# Patient Record
Sex: FEMALE | Race: WHITE | URBAN - METROPOLITAN AREA
[De-identification: names, ages, dates, MRNs, and addresses within clinical notes are randomized per-mention and may not be internally consistent; named-entity substitution may affect disease eponyms.]

---

## 2017-04-12 RX ORDER — ERGOCALCIFEROL 1.25 MG/1
50000 CAPSULE ORAL WEEKLY
Qty: 12 CAPSULE | Refills: 1 | Status: SHIPPED | OUTPATIENT
Start: 2017-04-12 | End: 2017-10-19 | Stop reason: SDUPTHER

## 2017-09-27 RX ORDER — DICLOFENAC SODIUM 75 MG/1
TABLET, DELAYED RELEASE ORAL
Qty: 60 TABLET | Refills: 1 | Status: SHIPPED | OUTPATIENT
Start: 2017-09-27 | End: 2018-02-26 | Stop reason: SDUPTHER

## 2017-10-09 ENCOUNTER — OFFICE VISIT (OUTPATIENT)
Dept: FAMILY MEDICINE CLINIC | Age: 62
End: 2017-10-09

## 2017-10-09 VITALS
HEART RATE: 94 BPM | BODY MASS INDEX: 53.92 KG/M2 | WEIGHT: 293 LBS | DIASTOLIC BLOOD PRESSURE: 70 MMHG | SYSTOLIC BLOOD PRESSURE: 118 MMHG | HEIGHT: 62 IN | OXYGEN SATURATION: 92 %

## 2017-10-09 DIAGNOSIS — M25.552 LEFT HIP PAIN: ICD-10-CM

## 2017-10-09 DIAGNOSIS — E78.00 PURE HYPERCHOLESTEROLEMIA: ICD-10-CM

## 2017-10-09 DIAGNOSIS — Z00.00 ANNUAL PHYSICAL EXAM: Primary | ICD-10-CM

## 2017-10-09 DIAGNOSIS — M17.10 ARTHRITIS OF KNEE: ICD-10-CM

## 2017-10-09 DIAGNOSIS — E21.3 HYPERPARATHYROIDISM (HCC): ICD-10-CM

## 2017-10-09 DIAGNOSIS — I10 BENIGN HYPERTENSION: ICD-10-CM

## 2017-10-09 DIAGNOSIS — Z23 FLU VACCINE NEED: ICD-10-CM

## 2017-10-09 DIAGNOSIS — R73.03 PRE-DIABETES: ICD-10-CM

## 2017-10-09 LAB
HCT VFR BLD CALC: 38.6 % (ref 36–48)
HEMOGLOBIN: 12.4 G/DL (ref 12–16)
MCH RBC QN AUTO: 27.6 PG (ref 26–34)
MCHC RBC AUTO-ENTMCNC: 32.2 G/DL (ref 31–36)
MCV RBC AUTO: 85.7 FL (ref 80–100)
PDW BLD-RTO: 15.2 % (ref 12.4–15.4)
PLATELET # BLD: 176 K/UL (ref 135–450)
PMV BLD AUTO: 10.4 FL (ref 5–10.5)
RBC # BLD: 4.5 M/UL (ref 4–5.2)
WBC # BLD: 8.4 K/UL (ref 4–11)

## 2017-10-09 PROCEDURE — 99396 PREV VISIT EST AGE 40-64: CPT | Performed by: FAMILY MEDICINE

## 2017-10-09 PROCEDURE — 90686 IIV4 VACC NO PRSV 0.5 ML IM: CPT | Performed by: FAMILY MEDICINE

## 2017-10-09 PROCEDURE — 90471 IMMUNIZATION ADMIN: CPT | Performed by: FAMILY MEDICINE

## 2017-10-09 RX ORDER — PERPHENAZINE/AMITRIPTYLINE HCL 4MG-10MG
1 TABLET ORAL 2 TIMES DAILY
COMMUNITY
End: 2019-09-27

## 2017-10-09 ASSESSMENT — PATIENT HEALTH QUESTIONNAIRE - PHQ9
SUM OF ALL RESPONSES TO PHQ9 QUESTIONS 1 & 2: 0
1. LITTLE INTEREST OR PLEASURE IN DOING THINGS: 0
2. FEELING DOWN, DEPRESSED OR HOPELESS: 0
SUM OF ALL RESPONSES TO PHQ QUESTIONS 1-9: 0

## 2017-10-09 ASSESSMENT — ENCOUNTER SYMPTOMS: RESPIRATORY NEGATIVE: 1

## 2017-10-09 NOTE — PROGRESS NOTES
Vaccine Information Sheet, \"Influenza - Inactivated\"  given to [employer], or parent/legal guardian of  [employer] and verbalized understanding. Patient responses:    Have you ever had a reaction to a flu vaccine? No  Are you able to eat eggs without adverse effects? Yes  Do you have any current illness? No  Have you ever had Guillian Sharples Syndrome? No    Flu vaccine given per order. Please see immunization tab.

## 2017-10-09 NOTE — PROGRESS NOTES
Subjective:      Patient ID: Joanne Schreiber is a 60 y.o. female. HPI   Pt is a of 60 y.o. female comes in today with   Chief Complaint   Patient presents with    Annual Exam     Patient is here for her yearly physical, is fasting. Here for annual.  Notes increasing left hip pain. Known knee arthritis  Saw ortho. Wt loss recommended but trouble exercising due to decreased mobility due to knee and hip pain    blood sugar elevated in the past.    pth elevated previously  Did not follow up or get repeat bloodwork done as ordered    No Known Allergies  Current Outpatient Prescriptions on File Prior to Visit   Medication Sig Dispense Refill    diclofenac (VOLTAREN) 75 MG EC tablet TAKE 1 TABLET BY MOUTH 2 TIMES DAILY 60 tablet 1    simvastatin (ZOCOR) 20 MG tablet TAKE 1 TABLET BY MOUTH NIGHTLY 90 tablet 1    amLODIPine (NORVASC) 10 MG tablet TAKE 1 TABLET BY MOUTH EVERY DAY 90 tablet 1    metFORMIN (GLUCOPHAGE) 500 MG tablet TAKE 2 TABLETS BY MOUTH TWICE A DAY WITH MEALS 360 tablet 1    lisinopril (PRINIVIL;ZESTRIL) 40 MG tablet TAKE 1 TABLET BY MOUTH DAILY 90 tablet 1    atenolol (TENORMIN) 100 MG tablet TAKE 1 TABLET BY MOUTH DAILY 90 tablet 1    vitamin D (ERGOCALCIFEROL) 19960 UNITS CAPS capsule Take 1 capsule by mouth once a week 12 capsule 1    Omega-3 Fatty Acids (FISH OIL) 1000 MG CAPS Take 1,000 mg by mouth 2 times daily      vitamin B-12 (CYANOCOBALAMIN) 100 MCG tablet Take 100 mcg by mouth daily      aspirin 325 MG tablet Take 325 mg by mouth daily.  Multiple Vitamins-Minerals (THERAPEUTIC MULTIVITAMIN-MINERALS) tablet Take 1 tablet by mouth daily.  cyclobenzaprine (FLEXERIL) 10 MG tablet Take 1 tablet by mouth 3 times daily as needed for Muscle spasms 30 tablet 1    lisinopril-hydrochlorothiazide (PRINZIDE;ZESTORETIC) 20-25 MG per tablet TAKE 1 TABLET BY MOUTH DAILY. 30 tablet 2     No current facility-administered medications on file prior to visit.        Review of Systems Constitutional: Negative. Respiratory: Negative. Cardiovascular: Negative. Objective:   Physical Exam   Constitutional: She is oriented to person, place, and time. She appears well-developed and well-nourished. HENT:   Head: Normocephalic. Mouth/Throat: Oropharynx is clear and moist.   Eyes: Conjunctivae are normal. No scleral icterus. Neck: Normal range of motion. Neck supple. No thyromegaly present. Cardiovascular: Normal rate, normal heart sounds and intact distal pulses. Exam reveals no gallop. No murmur heard. Pulmonary/Chest: Effort normal and breath sounds normal. She has no wheezes. Abdominal: Soft. Normal appearance and normal aorta. She exhibits no distension. There is no splenomegaly or hepatomegaly. There is no tenderness. Musculoskeletal: She exhibits no edema. Lymphadenopathy:        Head (right side): No submandibular adenopathy present. Head (left side): No submandibular adenopathy present. She has no cervical adenopathy. Right: No supraclavicular adenopathy present. Left: No supraclavicular adenopathy present. Neurological: She is alert and oriented to person, place, and time. No cranial nerve deficit. Reflex Scores:       Bicep reflexes are 2+ on the right side and 2+ on the left side. Patellar reflexes are 2+ on the right side and 2+ on the left side. Skin: Skin is warm and dry. No cyanosis. Nails show no clubbing. Psychiatric: She has a normal mood and affect. Her behavior is normal.       Assessment:      1. Annual physical exam  Comprehensive Metabolic Panel    Lipid Panel    Hemoglobin A1C    Vitamin D 25 Hydroxy    PTH, INTACT    CBC    HIV Screen    HEPATITIS C ANTIBODY    CANCELED: Hepatitis C Antibody   2. Arthritis of knee     3. Left hip pain  XR HIP LEFT (2-3 VIEWS)   4. Pre-diabetes  Hemoglobin A1C   5. Benign hypertension  Comprehensive Metabolic Panel    Lipid Panel    CBC   6. Pure hypercholesterolemia     7. Hyperparathyroidism ([facility]  Vitamin D 25 Hydroxy    PTH, INTACT   8. Flu vaccine need  INFLUENZA, QUADV, 3 YRS AND OLDER, IM, PF, PREFILL SYR OR SDV, 0.5ML (FLUZONE QUADV, PF)          Plan:      Norris Homans was seen today for annual exam.    Diagnoses and all orders for this visit:    Annual physical exam  -     Comprehensive Metabolic Panel  -     Lipid Panel  -     Hemoglobin A1C  -     Vitamin D 25 Hydroxy  -     PTH, INTACT  -     CBC  -     Cancel: Hepatitis C Antibody  -     HIV Screen  -     HEPATITIS C ANTIBODY  Reviewed diet and exercise  Arthritis of knee  Wt loss recommended. Consider aquatic therapy  Referred to ortho  Left hip pain  -     XR HIP LEFT (2-3 VIEWS)  Referred to ortho. Pre-diabetes  -     Hemoglobin A1C  Has been stable w/o meds  Benign hypertension  -     Comprehensive Metabolic Panel  -     Lipid Panel  -     CBC  The current medical regimen is effective;  continue present plan and medications. Pure hypercholesterolemia  Stable on statin  Hyperparathyroidism ([facility]  -     Vitamin D 25 Hydroxy  -     PTH, INTACT  Overdue for repeat bloodwork.  If still high will need intervention  Flu vaccine need  -     INFLUENZA, QUADV, 3 YRS AND OLDER, IM, PF, PREFILL SYR OR SDV, 0.5ML (FLUZONE QUADV, PF)

## 2017-10-10 LAB
A/G RATIO: 1.5 (ref 1.1–2.2)
ALBUMIN SERPL-MCNC: 4.2 G/DL (ref 3.4–5)
ALP BLD-CCNC: 37 U/L (ref 40–129)
ALT SERPL-CCNC: 12 U/L (ref 10–40)
ANION GAP SERPL CALCULATED.3IONS-SCNC: 14 MMOL/L (ref 3–16)
AST SERPL-CCNC: 13 U/L (ref 15–37)
BILIRUB SERPL-MCNC: 0.3 MG/DL (ref 0–1)
BUN BLDV-MCNC: 21 MG/DL (ref 7–20)
CALCIUM SERPL-MCNC: 11.6 MG/DL (ref 8.3–10.6)
CHLORIDE BLD-SCNC: 104 MMOL/L (ref 99–110)
CHOLESTEROL, TOTAL: 164 MG/DL (ref 0–199)
CO2: 25 MMOL/L (ref 21–32)
CREAT SERPL-MCNC: 0.7 MG/DL (ref 0.6–1.2)
ESTIMATED AVERAGE GLUCOSE: 116.9 MG/DL
GFR AFRICAN AMERICAN: >60
GFR NON-AFRICAN AMERICAN: >60
GLOBULIN: 2.8 G/DL
GLUCOSE BLD-MCNC: 117 MG/DL (ref 70–99)
HBA1C MFR BLD: 5.7 %
HDLC SERPL-MCNC: 49 MG/DL (ref 40–60)
HEPATITIS C ANTIBODY INTERPRETATION: NORMAL
HIV-1 AND HIV-2 ANTIBODIES: NORMAL
LDL CHOLESTEROL CALCULATED: 93 MG/DL
PARATHYROID HORMONE INTACT: 127.7 PG/ML (ref 14–72)
POTASSIUM SERPL-SCNC: 5.1 MMOL/L (ref 3.5–5.1)
SODIUM BLD-SCNC: 143 MMOL/L (ref 136–145)
TOTAL PROTEIN: 7 G/DL (ref 6.4–8.2)
TRIGL SERPL-MCNC: 111 MG/DL (ref 0–150)
VITAMIN D 25-HYDROXY: 34.3 NG/ML
VLDLC SERPL CALC-MCNC: 22 MG/DL

## 2017-10-12 DIAGNOSIS — M81.8 OTHER OSTEOPOROSIS WITHOUT CURRENT PATHOLOGICAL FRACTURE: ICD-10-CM

## 2017-10-12 DIAGNOSIS — E21.3 HYPERPARATHYROIDISM (HCC): Primary | ICD-10-CM

## 2017-10-12 PROBLEM — M81.0 OSTEOPOROSIS WITHOUT CURRENT PATHOLOGICAL FRACTURE: Status: ACTIVE | Noted: 2017-10-12

## 2017-10-16 ENCOUNTER — OFFICE VISIT (OUTPATIENT)
Dept: ORTHOPEDIC SURGERY | Age: 62
End: 2017-10-16

## 2017-10-16 VITALS
HEART RATE: 75 BPM | DIASTOLIC BLOOD PRESSURE: 85 MMHG | HEIGHT: 62 IN | WEIGHT: 293 LBS | BODY MASS INDEX: 53.92 KG/M2 | SYSTOLIC BLOOD PRESSURE: 120 MMHG

## 2017-10-16 DIAGNOSIS — M17.0 PRIMARY OSTEOARTHRITIS OF BOTH KNEES: Primary | ICD-10-CM

## 2017-10-16 DIAGNOSIS — M16.10 PRIMARY OSTEOARTHRITIS OF HIP, UNSPECIFIED LATERALITY: ICD-10-CM

## 2017-10-16 PROCEDURE — 99243 OFF/OP CNSLTJ NEW/EST LOW 30: CPT | Performed by: ORTHOPAEDIC SURGERY

## 2017-10-16 NOTE — LETTER
[address] [facility], [address] [place], [place] [address]is, [address] [facility] [place] [address]               Phone: [phone] Fax: [phone]      10/16/2017     Re: Joanne Schreiber    Dear Dr. Drew Wade: Thank you for the kind referral of your patient Joanne Schreiber. I can't see her my office on October 16, 2017. As you know, she is a very nice 28-year-old woman who has BMI of 67 has been suffering from bilateral lower extremity pain. She does have mild osteoarthritis in both hips but also appears to have advanced osteoarthritis of bilateral knees on radiographs. I indicated to [facility] that I do think that her symptoms will improve significantly if we can help reduce some of the load that her legs are seen. For every 1 pound of weight loss, her knees will see approximately 3-4 pound loss of load which could imply a very significant amount of pain relief with a slight amount of weight loss. As such, I'm going to refer her for a targeted physical therapy program to see if we can get her on a low impact exercise program that may assist with this. She was very interested in this. Additionally, she was very interested in seeing if there are any other weight loss solutions which may be beneficial and I did refer her to the [facility] weight loss [facility]. Lastly, if she still has persistent pain, we can consider an intra-articular steroid injection to both knees at her next visit. As always, please feel free to contact me if any questions. Thank you for entrusting me with the care of your patients.     Sincerely,      Helen Juarez MD  Orthopaedic Surgeon, Sports Medicine  Director, Hip Arthroscopy and [facility]

## 2017-10-16 NOTE — PROGRESS NOTES
Outpatient Prescriptions   Medication Sig Dispense Refill    Cinnamon 500 MG TABS Take 1 tablet by mouth 2 times daily      Coconut Oil 1000 MG CAPS Take 1 capsule by mouth 2 times daily      diclofenac (VOLTAREN) 75 MG EC tablet TAKE 1 TABLET BY MOUTH 2 TIMES DAILY 60 tablet 1    simvastatin (ZOCOR) 20 MG tablet TAKE 1 TABLET BY MOUTH NIGHTLY 90 tablet 1    amLODIPine (NORVASC) 10 MG tablet TAKE 1 TABLET BY MOUTH EVERY DAY 90 tablet 1    metFORMIN (GLUCOPHAGE) 500 MG tablet TAKE 2 TABLETS BY MOUTH TWICE A DAY WITH MEALS 360 tablet 1    lisinopril (PRINIVIL;ZESTRIL) 40 MG tablet TAKE 1 TABLET BY MOUTH DAILY 90 tablet 1    atenolol (TENORMIN) 100 MG tablet TAKE 1 TABLET BY MOUTH DAILY 90 tablet 1    vitamin D (ERGOCALCIFEROL) 60151 UNITS CAPS capsule Take 1 capsule by mouth once a week 12 capsule 1    cyclobenzaprine (FLEXERIL) 10 MG tablet Take 1 tablet by mouth 3 times daily as needed for Muscle spasms 30 tablet 1    Omega-3 Fatty Acids (FISH OIL) 1000 MG CAPS Take 1,000 mg by mouth 2 times daily      vitamin B-12 (CYANOCOBALAMIN) 100 MCG tablet Take 100 mcg by mouth daily      lisinopril-hydrochlorothiazide (PRINZIDE;ZESTORETIC) 20-25 MG per tablet TAKE 1 TABLET BY MOUTH DAILY. 30 tablet 2    aspirin 325 MG tablet Take 325 mg by mouth daily.  Multiple Vitamins-Minerals (THERAPEUTIC MULTIVITAMIN-MINERALS) tablet Take 1 tablet by mouth daily. No current facility-administered medications for this visit. Allergies:  No Known Allergies    Physical Exam:  Vitals:    10/16/17 1046   BP: 120/85   Pulse: 75     General: Aggie Fatima is a 60y.o. year old female who is sitting comfortably in our office in no acute distress. Alert and oriented.       Objective:   Physical Exam  Vital Signs:  /85   Pulse 75   Ht 5' 2\" (1.575 m)   Wt (!) 370 lb (167.8 kg)   BMI 67.67 kg/m²     Constitution:  Generally, Aggie Fatima is [x] alert, [x] appears stated age, and [x] in no distress. Her general body habitus is [] Cachectic  [] Thin  [x] Normal  [] Obese  [] Morbidly Obese    Head: [x] Normocephalic  Eyes: [x] Extra-occular muscles intact  Left Ear: [x] External Ear normal   Right Ear: [x] External Ear normal   Nose: [x] Normal  Mouth: [x] Oral mucosa moist  [x] No perioral lesions    Pulmonary: [x] Respirations unlabored and regular  Skin: [x] Warm [x] Well perfused     Psychiatric:   [x] Good judgement and insight  [x] Oriented to [x] person, [x] place, and [x] time  [x] Mood appropriate for circumstances    Gait:   Gait is [] Normal  [x] Impaired Antalgic gait  Assistive Device: [x] None  [] Knee Brace  [] Cane  [] Crutches   [] [place]   [] Wheelchair  [] Other      Right and left Knee ORTHOPAEDIC  EXAM:   Inspection:  [x] Skin intact without abrasion, lacerations or rashes  [x] Leg lengths equal  [] Effusion:  [x] none  [] mild  [] moderate  [] severe     There is significant subcutaneous tissue anteriorly medially and laterally about both knees     Range of Motion:  [x] No flexion contracture         [] Deferred: acute injury/post-surgery/pain  [] Flexion contracture    Flexion: 5-90 pain on terminal motion    Palpation:   Diffuse tenderness particularly over medial joint line bilaterally    Ligamentous and Provocative testing:  [x] Negative - Stable in varus/valgus at 30 and 0 deg flexion, negative posterior drawer, negative Lachman      Motor Function:  [x] No gross motor weakness of hip [x] No gross motor weakness of knee  [x] No gross motor weakness of ankle    [x] No gross motor weakness of great toe    [x] Motor strength: 5/5 L4-S1    Neurologic:   [x] Sensation to light touch intact  [x] Coordination / proprioception intact  Motor function intact L2-S1    Circulation:  [x] The limb is warm and well perfused. [x] Capillary refill is intact.   [] Edema:  [x] none  [] mild  [] moderate  [] severe     Negative Homans sign - no calf tenderness    Bilateral hip exam:  No gross

## 2017-10-20 RX ORDER — LISINOPRIL 40 MG/1
40 TABLET ORAL DAILY
Qty: 90 TABLET | Refills: 1 | Status: SHIPPED | OUTPATIENT
Start: 2017-10-20 | End: 2018-03-28 | Stop reason: SDUPTHER

## 2017-10-20 RX ORDER — AMLODIPINE BESYLATE 10 MG/1
10 TABLET ORAL DAILY
Qty: 90 TABLET | Refills: 1 | Status: SHIPPED | OUTPATIENT
Start: 2017-10-20 | End: 2018-03-28 | Stop reason: SDUPTHER

## 2017-10-20 RX ORDER — ATENOLOL 100 MG/1
100 TABLET ORAL DAILY
Qty: 90 TABLET | Refills: 1 | Status: SHIPPED | OUTPATIENT
Start: 2017-10-20 | End: 2018-03-28 | Stop reason: SDUPTHER

## 2017-10-20 RX ORDER — ERGOCALCIFEROL 1.25 MG/1
50000 CAPSULE ORAL WEEKLY
Qty: 12 CAPSULE | Refills: 1 | Status: SHIPPED | OUTPATIENT
Start: 2017-10-20 | End: 2018-04-08 | Stop reason: SDUPTHER

## 2017-10-20 RX ORDER — SIMVASTATIN 20 MG
20 TABLET ORAL NIGHTLY
Qty: 90 TABLET | Refills: 1 | Status: SHIPPED | OUTPATIENT
Start: 2017-10-20 | End: 2018-03-28 | Stop reason: SDUPTHER

## 2017-10-24 RX ORDER — CYCLOBENZAPRINE HCL 10 MG
10 TABLET ORAL 3 TIMES DAILY PRN
Qty: 30 TABLET | Refills: 1 | Status: SHIPPED | OUTPATIENT
Start: 2017-10-24 | End: 2017-11-29

## 2017-10-24 NOTE — TELEPHONE ENCOUNTER
Last Fill - 4/15/16  Last Office Visit - 10/9/17 - Dr Payton Kidney  Pending Appointment - not scheduled

## 2017-11-29 ENCOUNTER — OFFICE VISIT (OUTPATIENT)
Dept: ENDOCRINOLOGY | Age: 62
End: 2017-11-29

## 2017-11-29 VITALS
BODY MASS INDEX: 53.92 KG/M2 | HEIGHT: 62 IN | WEIGHT: 293 LBS | SYSTOLIC BLOOD PRESSURE: 117 MMHG | OXYGEN SATURATION: 96 % | HEART RATE: 64 BPM | DIASTOLIC BLOOD PRESSURE: 66 MMHG

## 2017-11-29 DIAGNOSIS — E83.52 HYPERCALCEMIA: ICD-10-CM

## 2017-11-29 DIAGNOSIS — E21.3 HYPERPARATHYROIDISM (HCC): Primary | ICD-10-CM

## 2017-11-29 DIAGNOSIS — M81.0 POSTMENOPAUSAL OSTEOPOROSIS: ICD-10-CM

## 2017-11-29 DIAGNOSIS — E28.319 PREMATURE MENOPAUSE: ICD-10-CM

## 2017-11-29 DIAGNOSIS — E55.9 VITAMIN D DEFICIENCY: ICD-10-CM

## 2017-11-29 LAB
T4 FREE: 1.3 NG/DL (ref 0.9–1.8)
TSH SERPL DL<=0.05 MIU/L-ACNC: 2.01 UIU/ML (ref 0.27–4.2)

## 2017-11-29 PROCEDURE — 99244 OFF/OP CNSLTJ NEW/EST MOD 40: CPT | Performed by: INTERNAL MEDICINE

## 2017-11-29 RX ORDER — IBUPROFEN 200 MG
1 CAPSULE ORAL DAILY
Qty: 30 TABLET | Refills: 3 | Status: SHIPPED | OUTPATIENT
Start: 2017-11-29 | End: 2019-09-24

## 2017-11-29 RX ORDER — ALENDRONATE SODIUM 70 MG/1
70 TABLET ORAL
Qty: 4 TABLET | Refills: 3 | Status: SHIPPED | OUTPATIENT
Start: 2017-11-29 | End: 2019-09-27 | Stop reason: ALTCHOICE

## 2017-11-29 ASSESSMENT — PATIENT HEALTH QUESTIONNAIRE - PHQ9
SUM OF ALL RESPONSES TO PHQ9 QUESTIONS 1 & 2: 0
SUM OF ALL RESPONSES TO PHQ QUESTIONS 1-9: 0
1. LITTLE INTEREST OR PLEASURE IN DOING THINGS: 0
2. FEELING DOWN, DEPRESSED OR HOPELESS: 0
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
## 2017-12-04 DIAGNOSIS — E21.3 HYPERPARATHYROIDISM (HCC): ICD-10-CM

## 2017-12-04 LAB
CALCIUM 24 HOUR URINE: 195 MG/24 HR (ref 42–353)
MAGNESIUM: 1.2 MG/DL (ref 1.8–2.4)
PHOSPHORUS: 2.4 MG/DL (ref 2.5–4.9)
SODIUM 24 HOUR URINE: 125 MMOL/24 HR (ref 40–220)

## 2017-12-05 LAB
24HR URINE VOLUME (ML): 1400 ML
CREAT SERPL-MCNC: 0.6 MG/DL (ref 0.6–1.2)
CREATININE 24 HOUR URINE: 0.8 G/24HR (ref 0.6–1.5)
CREATININE CLEARANCE: 97 ML/MIN (ref 88–128)
Lab: 24 HR

## 2017-12-06 ENCOUNTER — TELEPHONE (OUTPATIENT)
Dept: FAMILY MEDICINE CLINIC | Age: 62
End: 2017-12-06

## 2017-12-06 DIAGNOSIS — E21.3 HYPERPARATHYROIDISM (HCC): Primary | ICD-10-CM

## 2017-12-06 DIAGNOSIS — E83.42 HYPOMAGNESEMIA: ICD-10-CM

## 2017-12-06 LAB
CALCIUM IONIZED, CALC AT PH 7.4: 1.59 MMOL/L (ref 1.11–1.3)
CALCIUM IONIZED: 1.5 MMOL/L (ref 1.11–1.3)

## 2017-12-07 ENCOUNTER — TELEPHONE (OUTPATIENT)
Dept: ENDOCRINOLOGY | Age: 62
End: 2017-12-07

## 2017-12-07 NOTE — TELEPHONE ENCOUNTER
Called Mrs Moise Fenton to find out what type of dye she is allergic to as her chart states no drug allergies. She stated she forgot to mention this at recent office visit. She also stated she has very thin veins and is a very difficult stick. Would like to know if there is a test  she can do that will use oral dye?

## 2017-12-07 NOTE — TELEPHONE ENCOUNTER
Called Mrs. Macdonald and left a message to contact the office for message below:      Yosef Juarez MD Physician Signed  Date of Service: 12/07/2017 10:53 AM         Spoke to [facility] at [facility]   It has sestamibi contrast and not iodine. Spoke to patient. She reports mild rash with iodinated contrast done in her 30's for kidney issues. Never had Sestimibi contrast. She already scheduled the scan for next Thursday.

## 2017-12-14 ENCOUNTER — TELEPHONE (OUTPATIENT)
Dept: ENDOCRINOLOGY | Age: 62
End: 2017-12-14

## 2017-12-14 ENCOUNTER — HOSPITAL ENCOUNTER (OUTPATIENT)
Dept: ULTRASOUND IMAGING | Age: 62
Discharge: HOME | End: 2017-12-14
Attending: INTERNAL MEDICINE | Admitting: INTERNAL MEDICINE

## 2017-12-14 DIAGNOSIS — E21.3 HYPERPARATHYROIDISM (HCC): ICD-10-CM

## 2017-12-15 NOTE — TELEPHONE ENCOUNTER
Left another  message for Mrs Bishop  to contact the office for results below:        Message from Aristides Mirza MD sent at 12/14/2017 10:47 AM EST -----  Please inform thyroid US shows multiple small nodules but no parathyroid adenoma. Will wait for parathyroid scan.    Thank you

## 2017-12-18 ENCOUNTER — TELEPHONE (OUTPATIENT)
Dept: ENDOCRINOLOGY | Age: 62
End: 2017-12-18

## 2017-12-18 DIAGNOSIS — E21.3 HYPERPARATHYROIDISM (HCC): Primary | ICD-10-CM

## 2017-12-18 NOTE — TELEPHONE ENCOUNTER
Pt called to have Dr. Zane Cadet call her back about her recent tests at [facility] [employer] [phone], she will be there until 5pm today

## 2017-12-28 ENCOUNTER — TELEPHONE (OUTPATIENT)
Dept: ENDOCRINOLOGY | Age: 62
End: 2017-12-28

## 2018-02-02 ENCOUNTER — TELEPHONE (OUTPATIENT)
Dept: ENDOCRINOLOGY | Age: 63
End: 2018-02-02

## 2018-02-02 NOTE — TELEPHONE ENCOUNTER
Pt was referred to Dr. Cass Lacey by Dr. Leonora Vale, Dr. Cass Lacey would like the scans of the pt's Parathyroid faxed to their office at [phone], she has a consultation appt on 2/14/18 at that office

## 2018-02-02 NOTE — TELEPHONE ENCOUNTER
Mrs. Senior Giovanny stated the report needs to be faxed. Scans are not needed. Report faxed to number provided.

## 2018-02-26 RX ORDER — DICLOFENAC SODIUM 75 MG/1
TABLET, DELAYED RELEASE ORAL
Qty: 60 TABLET | Refills: 1 | Status: SHIPPED | OUTPATIENT
Start: 2018-02-26 | End: 2018-07-05 | Stop reason: SDUPTHER

## 2018-03-20 ENCOUNTER — TELEPHONE (OUTPATIENT)
Dept: FAMILY MEDICINE CLINIC | Age: 63
End: 2018-03-20

## 2018-03-28 RX ORDER — SIMVASTATIN 20 MG
20 TABLET ORAL NIGHTLY
Qty: 90 TABLET | Refills: 1 | Status: SHIPPED | OUTPATIENT
Start: 2018-03-28 | End: 2018-10-24 | Stop reason: SDUPTHER

## 2018-03-28 RX ORDER — LISINOPRIL 40 MG/1
40 TABLET ORAL DAILY
Qty: 90 TABLET | Refills: 1 | Status: SHIPPED | OUTPATIENT
Start: 2018-03-28 | End: 2018-09-06 | Stop reason: SDUPTHER

## 2018-03-28 RX ORDER — AMLODIPINE BESYLATE 10 MG/1
10 TABLET ORAL DAILY
Qty: 90 TABLET | Refills: 1 | Status: SHIPPED | OUTPATIENT
Start: 2018-03-28 | End: 2018-09-06 | Stop reason: SDUPTHER

## 2018-03-28 RX ORDER — ATENOLOL 100 MG/1
100 TABLET ORAL DAILY
Qty: 90 TABLET | Refills: 1 | Status: SHIPPED | OUTPATIENT
Start: 2018-03-28 | End: 2018-10-24 | Stop reason: SDUPTHER

## 2018-04-09 ENCOUNTER — OFFICE VISIT (OUTPATIENT)
Dept: FAMILY MEDICINE CLINIC | Age: 63
End: 2018-04-09

## 2018-04-09 VITALS
OXYGEN SATURATION: 98 % | DIASTOLIC BLOOD PRESSURE: 68 MMHG | WEIGHT: 293 LBS | HEART RATE: 56 BPM | HEIGHT: 62 IN | BODY MASS INDEX: 53.92 KG/M2 | SYSTOLIC BLOOD PRESSURE: 100 MMHG

## 2018-04-09 DIAGNOSIS — Z01.818 PREOP EXAMINATION: Primary | ICD-10-CM

## 2018-04-09 DIAGNOSIS — R73.03 PRE-DIABETES: ICD-10-CM

## 2018-04-09 DIAGNOSIS — E78.00 PURE HYPERCHOLESTEROLEMIA: ICD-10-CM

## 2018-04-09 DIAGNOSIS — E21.3 HYPERPARATHYROIDISM (HCC): ICD-10-CM

## 2018-04-09 DIAGNOSIS — I10 BENIGN HYPERTENSION: ICD-10-CM

## 2018-04-09 LAB
A/G RATIO: 1.7 (ref 1.1–2.2)
ALBUMIN SERPL-MCNC: 4.4 G/DL (ref 3.4–5)
ALP BLD-CCNC: 40 U/L (ref 40–129)
ALT SERPL-CCNC: 9 U/L (ref 10–40)
ANION GAP SERPL CALCULATED.3IONS-SCNC: 18 MMOL/L (ref 3–16)
AST SERPL-CCNC: 13 U/L (ref 15–37)
BILIRUB SERPL-MCNC: 0.4 MG/DL (ref 0–1)
BUN BLDV-MCNC: 14 MG/DL (ref 7–20)
CALCIUM SERPL-MCNC: 10.7 MG/DL (ref 8.3–10.6)
CHLORIDE BLD-SCNC: 101 MMOL/L (ref 99–110)
CHOLESTEROL, TOTAL: 169 MG/DL (ref 0–199)
CO2: 22 MMOL/L (ref 21–32)
CREAT SERPL-MCNC: 0.6 MG/DL (ref 0.6–1.2)
GFR AFRICAN AMERICAN: >60
GFR NON-AFRICAN AMERICAN: >60
GLOBULIN: 2.6 G/DL
GLUCOSE BLD-MCNC: 102 MG/DL (ref 70–99)
HCT VFR BLD CALC: 37.1 % (ref 36–48)
HDLC SERPL-MCNC: 52 MG/DL (ref 40–60)
HEMOGLOBIN: 12.1 G/DL (ref 12–16)
LDL CHOLESTEROL CALCULATED: 90 MG/DL
MCH RBC QN AUTO: 27.4 PG (ref 26–34)
MCHC RBC AUTO-ENTMCNC: 32.5 G/DL (ref 31–36)
MCV RBC AUTO: 84.3 FL (ref 80–100)
PARATHYROID HORMONE INTACT: 171.3 PG/ML (ref 14–72)
PDW BLD-RTO: 14.3 % (ref 12.4–15.4)
PLATELET # BLD: 204 K/UL (ref 135–450)
PMV BLD AUTO: 10.8 FL (ref 5–10.5)
POTASSIUM SERPL-SCNC: 5.6 MMOL/L (ref 3.5–5.1)
RBC # BLD: 4.4 M/UL (ref 4–5.2)
SODIUM BLD-SCNC: 141 MMOL/L (ref 136–145)
TOTAL PROTEIN: 7 G/DL (ref 6.4–8.2)
TRIGL SERPL-MCNC: 136 MG/DL (ref 0–150)
VLDLC SERPL CALC-MCNC: 27 MG/DL
WBC # BLD: 7.1 K/UL (ref 4–11)

## 2018-04-09 PROCEDURE — 99214 OFFICE O/P EST MOD 30 MIN: CPT | Performed by: FAMILY MEDICINE

## 2018-04-09 RX ORDER — ERGOCALCIFEROL 1.25 MG/1
50000 CAPSULE ORAL WEEKLY
Qty: 12 CAPSULE | Refills: 1 | Status: SHIPPED | OUTPATIENT
Start: 2018-04-09 | End: 2018-08-08 | Stop reason: SDUPTHER

## 2018-04-09 ASSESSMENT — ENCOUNTER SYMPTOMS: RESPIRATORY NEGATIVE: 1

## 2018-04-10 LAB
ESTIMATED AVERAGE GLUCOSE: 122.6 MG/DL
HBA1C MFR BLD: 5.9 %
TSH REFLEX: 4.01 UIU/ML (ref 0.27–4.2)
VITAMIN D 25-HYDROXY: 36.4 NG/ML

## 2018-04-17 ENCOUNTER — TELEPHONE (OUTPATIENT)
Dept: FAMILY MEDICINE CLINIC | Age: 63
End: 2018-04-17

## 2018-06-07 ENCOUNTER — TELEPHONE (OUTPATIENT)
Dept: FAMILY MEDICINE CLINIC | Age: 63
End: 2018-06-07

## 2018-07-06 RX ORDER — DICLOFENAC SODIUM 75 MG/1
TABLET, DELAYED RELEASE ORAL
Qty: 60 TABLET | Refills: 1 | Status: SHIPPED | OUTPATIENT
Start: 2018-07-06 | End: 2018-10-19 | Stop reason: SDUPTHER

## 2018-08-09 RX ORDER — ERGOCALCIFEROL 1.25 MG/1
50000 CAPSULE ORAL WEEKLY
Qty: 4 CAPSULE | Refills: 0 | Status: SHIPPED | OUTPATIENT
Start: 2018-08-09 | End: 2018-09-07 | Stop reason: SDUPTHER

## 2018-08-23 ENCOUNTER — TELEPHONE (OUTPATIENT)
Dept: FAMILY MEDICINE CLINIC | Age: 63
End: 2018-08-23

## 2018-09-07 RX ORDER — ERGOCALCIFEROL 1.25 MG/1
CAPSULE ORAL
Qty: 4 CAPSULE | Refills: 0 | Status: SHIPPED | OUTPATIENT
Start: 2018-09-07 | End: 2018-10-05 | Stop reason: SDUPTHER

## 2018-09-07 RX ORDER — AMLODIPINE BESYLATE 10 MG/1
TABLET ORAL
Qty: 90 TABLET | Refills: 0 | Status: SHIPPED | OUTPATIENT
Start: 2018-09-07 | End: 2018-12-07 | Stop reason: SDUPTHER

## 2018-09-07 RX ORDER — LISINOPRIL 40 MG/1
40 TABLET ORAL DAILY
Qty: 90 TABLET | Refills: 0 | Status: SHIPPED | OUTPATIENT
Start: 2018-09-07 | End: 2018-12-07 | Stop reason: SDUPTHER

## 2018-09-07 NOTE — TELEPHONE ENCOUNTER
Medication:   Requested Prescriptions     Pending Prescriptions Disp Refills    vitamin D (ERGOCALCIFEROL) 49378 units CAPS capsule [Pharmacy Med Name: VITAMIN D2 50,000IU (ERGO) CAP RX] 4 capsule 0     Sig: TAKE 1 CAPSULE BY MOUTH 1 TIME A WEEK       Last Filled:      Patient Phone Number: [phone] (home) [phone] (work)    Last appt: 4/9/2018   Next appt: Visit date not found    Last Labs DM:   Lab Results   Component Value Date    VITD25 36.4 04/09/2018    VITD25 34.3 10/09/2017       Last OARRS: No flowsheet data found.     Preferred Pharmacy:   [facility] [address] [place], [place] [address] - [facility] [phone] Alex Aneudy [phone]  [address] [place] [address] [id number] [place] [id number]  Phone: [phone] Fax: [phone]

## 2018-10-05 RX ORDER — ERGOCALCIFEROL 1.25 MG/1
CAPSULE ORAL
Qty: 4 CAPSULE | Refills: 0 | Status: SHIPPED | OUTPATIENT
Start: 2018-10-05 | End: 2019-09-27 | Stop reason: ALTCHOICE

## 2018-10-22 RX ORDER — DICLOFENAC SODIUM 75 MG/1
75 TABLET, DELAYED RELEASE ORAL 2 TIMES DAILY
Qty: 60 TABLET | Refills: 0 | Status: SHIPPED | OUTPATIENT
Start: 2018-10-22 | End: 2018-11-23 | Stop reason: SDUPTHER

## 2018-10-24 RX ORDER — SIMVASTATIN 20 MG
TABLET ORAL
Qty: 90 TABLET | Refills: 0 | Status: SHIPPED | OUTPATIENT
Start: 2018-10-24 | End: 2019-01-22 | Stop reason: SDUPTHER

## 2018-10-24 RX ORDER — ATENOLOL 100 MG/1
TABLET ORAL
Qty: 90 TABLET | Refills: 0 | Status: SHIPPED | OUTPATIENT
Start: 2018-10-24 | End: 2019-01-22 | Stop reason: SDUPTHER

## 2018-11-23 RX ORDER — DICLOFENAC SODIUM 75 MG/1
TABLET, DELAYED RELEASE ORAL
Qty: 60 TABLET | Refills: 2 | Status: SHIPPED | OUTPATIENT
Start: 2018-11-23 | End: 2019-04-24 | Stop reason: SDUPTHER

## 2018-12-07 RX ORDER — LISINOPRIL 40 MG/1
40 TABLET ORAL DAILY
Qty: 90 TABLET | Refills: 0 | Status: SHIPPED | OUTPATIENT
Start: 2018-12-07 | End: 2019-03-11 | Stop reason: SDUPTHER

## 2018-12-07 RX ORDER — AMLODIPINE BESYLATE 10 MG/1
TABLET ORAL
Qty: 90 TABLET | Refills: 0 | Status: SHIPPED | OUTPATIENT
Start: 2018-12-07 | End: 2019-03-11 | Stop reason: SDUPTHER

## 2019-01-22 RX ORDER — ATENOLOL 100 MG/1
100 TABLET ORAL DAILY
Qty: 90 TABLET | Refills: 1 | Status: SHIPPED | OUTPATIENT
Start: 2019-01-22 | End: 2019-05-16

## 2019-01-22 RX ORDER — SIMVASTATIN 20 MG
20 TABLET ORAL NIGHTLY
Qty: 90 TABLET | Refills: 1 | Status: SHIPPED | OUTPATIENT
Start: 2019-01-22 | End: 2019-09-08 | Stop reason: SDUPTHER

## 2019-03-11 RX ORDER — LISINOPRIL 40 MG/1
40 TABLET ORAL DAILY
Qty: 90 TABLET | Refills: 0 | Status: SHIPPED | OUTPATIENT
Start: 2019-03-11 | End: 2019-06-23 | Stop reason: SDUPTHER

## 2019-03-11 RX ORDER — AMLODIPINE BESYLATE 10 MG/1
TABLET ORAL
Qty: 90 TABLET | Refills: 0 | Status: SHIPPED | OUTPATIENT
Start: 2019-03-11 | End: 2019-06-23 | Stop reason: SDUPTHER

## 2019-04-24 RX ORDER — DICLOFENAC SODIUM 75 MG/1
TABLET, DELAYED RELEASE ORAL
Qty: 60 TABLET | Refills: 0 | Status: SHIPPED | OUTPATIENT
Start: 2019-04-24 | End: 2019-09-24 | Stop reason: ALTCHOICE

## 2019-05-08 ENCOUNTER — TELEPHONE (OUTPATIENT)
Dept: FAMILY MEDICINE CLINIC | Age: 64
End: 2019-05-08

## 2019-05-08 NOTE — TELEPHONE ENCOUNTER
Received fmla forms to be completed. Left message for patient that she needs to have an appointment for these to be completed.

## 2019-05-13 ENCOUNTER — OFFICE VISIT (OUTPATIENT)
Dept: FAMILY MEDICINE CLINIC | Age: 64
End: 2019-05-13
Payer: COMMERCIAL

## 2019-05-13 VITALS
DIASTOLIC BLOOD PRESSURE: 88 MMHG | RESPIRATION RATE: 20 BRPM | SYSTOLIC BLOOD PRESSURE: 132 MMHG | OXYGEN SATURATION: 90 % | HEIGHT: 62 IN | HEART RATE: 101 BPM | BODY MASS INDEX: 66.94 KG/M2

## 2019-05-13 DIAGNOSIS — E78.00 PURE HYPERCHOLESTEROLEMIA: ICD-10-CM

## 2019-05-13 DIAGNOSIS — R73.03 PRE-DIABETES: ICD-10-CM

## 2019-05-13 DIAGNOSIS — I10 BENIGN HYPERTENSION: ICD-10-CM
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
## 2019-05-13 DIAGNOSIS — M17.10 ARTHRITIS OF KNEE: Primary | ICD-10-CM

## 2019-05-13 DIAGNOSIS — R26.81 UNSTEADY GAIT: ICD-10-CM

## 2019-05-13 LAB
A/G RATIO: 1.1 (ref 1.1–2.2)
ALBUMIN SERPL-MCNC: 3.7 G/DL (ref 3.4–5)
ALP BLD-CCNC: 41 U/L (ref 40–129)
ALT SERPL-CCNC: 10 U/L (ref 10–40)
ANION GAP SERPL CALCULATED.3IONS-SCNC: 13 MMOL/L (ref 3–16)
AST SERPL-CCNC: 21 U/L (ref 15–37)
BILIRUB SERPL-MCNC: 0.4 MG/DL (ref 0–1)
BUN BLDV-MCNC: 10 MG/DL (ref 7–20)
CALCIUM SERPL-MCNC: 9.2 MG/DL (ref 8.3–10.6)
CHLORIDE BLD-SCNC: 101 MMOL/L (ref 99–110)
CHOLESTEROL, TOTAL: 149 MG/DL (ref 0–199)
CO2: 29 MMOL/L (ref 21–32)
CREAT SERPL-MCNC: 0.8 MG/DL (ref 0.6–1.2)
GFR AFRICAN AMERICAN: >60
GFR NON-AFRICAN AMERICAN: >60
GLOBULIN: 3.4 G/DL
GLUCOSE BLD-MCNC: 119 MG/DL (ref 70–99)
HDLC SERPL-MCNC: 39 MG/DL (ref 40–60)
LDL CHOLESTEROL CALCULATED: 88 MG/DL
POTASSIUM SERPL-SCNC: 4.9 MMOL/L (ref 3.5–5.1)
SODIUM BLD-SCNC: 143 MMOL/L (ref 136–145)
TOTAL PROTEIN: 7.1 G/DL (ref 6.4–8.2)
TRIGL SERPL-MCNC: 112 MG/DL (ref 0–150)
VLDLC SERPL CALC-MCNC: 22 MG/DL

## 2019-05-13 PROCEDURE — 99214 OFFICE O/P EST MOD 30 MIN: CPT | Performed by: FAMILY MEDICINE

## 2019-05-13 RX ORDER — NYSTATIN 100000 U/G
CREAM TOPICAL
Qty: 30 G | Refills: 2 | Status: SHIPPED | OUTPATIENT
Start: 2019-05-13 | End: 2019-09-27 | Stop reason: SDUPTHER

## 2019-05-13 RX ORDER — OLOPATADINE HYDROCHLORIDE 1 MG/ML
1 SOLUTION/ DROPS OPHTHALMIC 2 TIMES DAILY
Qty: 1 BOTTLE | Refills: 2 | Status: SHIPPED | OUTPATIENT
Start: 2019-05-13 | End: 2019-06-12

## 2019-05-13 ASSESSMENT — ENCOUNTER SYMPTOMS: RESPIRATORY NEGATIVE: 1

## 2019-05-13 ASSESSMENT — PATIENT HEALTH QUESTIONNAIRE - PHQ9
2. FEELING DOWN, DEPRESSED OR HOPELESS: 0
SUM OF ALL RESPONSES TO PHQ QUESTIONS 1-9: 0
SUM OF ALL RESPONSES TO PHQ9 QUESTIONS 1 & 2: 0
SUM OF ALL RESPONSES TO PHQ QUESTIONS 1-9: 0
1. LITTLE INTEREST OR PLEASURE IN DOING THINGS: 0

## 2019-05-13 NOTE — LETTER
Patient Name: Rachel Mendosa  [birth year]      [facility]  Certification of Health Care Provider for  [facility] Serious Health Condition  (Family and Medical Leave Act)    Attached please find the applicable completed [address] [place] [address] [facility]) certification form. [address] [facility] certification forms (W-380-E, W-380-F, [id number], & WH-385-V). Accordingly, the attached form complies in all material respects with applicable [facility] regulations and is being provided in lieu of any certification forms submitted to [facility]) by the Employer. Provider's name: Alec Martines MD      [facility] PRIMARY CARE  [facility] [address] [address]  Dept: [phone]  Dept Fax: [phone]  Loc: [phone]    PART A: MEDICAL FACTS  1. Approximate date condition commenced: 1/1/19. Probable duration of condition: lifetime. Scout below as applicable:  Was the patient admitted for an overnight stay in a hospital, hospice, or residential medical     care facility? No  If so, dates of admission: N/A. Date(s) you treated the patient for condition: 5/13/19. Will the patient need to have treatment visits at least twice per year due to the condition? Yes. Was medication, other than over-the-counter medication, prescribed? No     Was the patient referred to other health care provider(s) for evaluation or treatment (e.g.,            physical therapist)? Yes. If so, state the nature of such treatments and expected          duration of treatment: physical therapy. 2. Is the medical condition pregnancy? No. If so, expected delivery date: N/A.    3. Use any information provided by the employer to answer this question. If the employer fails to provide a list of the employee's essential functions or a job description, answer these             questions based upon the employee's own description of his/her job functions. Is the employee unable to perform any of his/her job functions due to the condition:             Yes.    If so, Identify the job functions the employee is unable to perform: unable to stand or walk on days knees are bad. 4. Describe other relevant medical facts, if any, related to the condition for which the employee    seeks leave (such medical facts may include symptoms, diagnosis, or any regimen of continuing treatment such as the use of specialized equipment): condition is progressive and results in severe pain. flares are difficult to predict. PART B: AMOUNT OF LEAVE NEEDED  5. Will the employee be incapacitated for a single continuous period of time due to his/her medical condition, including any time for treatment and recovery? No.     If so, estimate the beginning and ending dates for the period of incapacity: 1/1/19 through 1/1/20.    6. Will the employee need to attend follow-up treatment appointments or work part-time or on a reduced schedule because of the employee's medical condition? Yes. If so, are the treatments or the reduced number of hours of work medically necessary? Yes. Estimated treatment schedule, if any, including the dates of any scheduled   appointments and the time required for each appointment, including any recovery period: PT will be set up. will need to go 2-3 times/week for this. Estimate the part-time or reduced work schedule the employee needs, if any: 1 hour(s) per day, 2 day(s) per week from 5/13/19 through 8/13/19.       7. Will the condition cause episodic flare-ups periodically preventing the employee from  performing his/her job functions? Yes. Is it medically necessary for the employee to be absent from work during flare-ups? Yes. If so, explain, unable to work during flares due to severe pain.               Based upon the patient's medical history and your knowledge of the medical condition, estimate the frequency of flare-ups and the duration of related incapacity that the patient may have over the next 6 months (e.g., 1 episode every 3 months lasting 1-2 days): 3 episodes/month, 2-4 days per episode      ___ ____  Sara Garcia  5/13/19

## 2019-05-13 NOTE — PROGRESS NOTES
getting out after recent fall  Pre-diabetes  -     Hemoglobin A1C; Future  Noncompliant. Rechecking bloodwork. Benign hypertension  -     Lipid Panel; Future  -     Comprehensive Metabolic Panel; Future  Likely resume ace and amlodipine  Pure hypercholesterolemia  Noncompliant. Will restart statin if needed  Fall, subsequent encounter, Unsteady gait  Referred to physical therapy   Recommend home evaluation for services  Other orders  -     olopatadine (PATANOL) 0.1 % ophthalmic solution; Place 1 drop into both eyes 2 times daily  -     nystatin (MYCOSTATIN) 582535 UNIT/GM cream; Apply topically 2 times daily.              Rancho Lock MD

## 2019-05-14 ENCOUNTER — TELEPHONE (OUTPATIENT)
Dept: FAMILY MEDICINE CLINIC | Age: 64
End: 2019-05-14

## 2019-05-14 LAB
ESTIMATED AVERAGE GLUCOSE: 128.4 MG/DL
HBA1C MFR BLD: 6.1 %

## 2019-05-16 NOTE — TELEPHONE ENCOUNTER
Called patient regarding her blood results, results given to her. Asking if she should start taking all her medications again or just some of them. She has not taking any medication in over a month. Needs to know when she should start work again .     Would like to go back  Tues 5/21/19    Call back [phone] (MY)

## 2019-05-17 NOTE — TELEPHONE ENCOUNTER
Left message for patient to call back. Also see result note. Notes recorded by Nirav Keller MD on 5/16/2019 at 7:12 PM EDT  Recommend simvastatin since indicated with DM  Can hold metformin and atenolol. Recommend amlodipine and lisinopril.   I'm not sure how her knee is doing; if she feels safe to return Monday or if needs another week

## 2019-05-17 NOTE — TELEPHONE ENCOUNTER
Patient informed - would like to go back to work on Tuesday.  Will call us back if she needs a note to go back

## 2019-05-23 ENCOUNTER — TELEPHONE (OUTPATIENT)
Dept: FAMILY MEDICINE CLINIC | Age: 64
End: 2019-05-23

## 2019-05-23 NOTE — LETTER
[facility] Primary Care  [address] [address]  Phone: [phone]  Fax: [phone]    Starla Haji MD        May 23, 2019      Maribel Jackson   1955  Employee ID#: [id number]    To Whom it May Concern    It is in my medial opinion that Maribel Jackson ( 53) to be off work 19-19. She may return to work 2019 with no restrictions. If you have any other questions or concerns, please don't hesitate to call.       Sincerely,    Starla Haji MD

## 2019-05-23 NOTE — TELEPHONE ENCOUNTER
Pt called requesting that a letter be written as soon as possible for her to return to work. Pt states her employer\"s making her leave work today until the letter has been received. Letter must include:    Dates off - 04/22-05/22  Employee ID# - [id number]  State that pt is able to return to work    Fax letter to both  - hr - [phone] and manager - [phone]    Please call pt once letter has been completed and faxed at [phone].

## 2019-05-31 ENCOUNTER — TELEPHONE (OUTPATIENT)
Dept: FAMILY MEDICINE CLINIC | Age: 64
End: 2019-05-31

## 2019-05-31 NOTE — TELEPHONE ENCOUNTER
[phone] - left  for patient. Informed in message this was faxed on 05.13.2019 and need to know what if anything else needs to be done.

## 2019-06-03 NOTE — TELEPHONE ENCOUNTER
Patient called at 5:26 pm on Friday, May 31. She left a message on our after hours voicemail. Regarding the FMLA paperwork sent in by our office. They sent back the paperwork for this and for her leave for the fall in April. The two got intertwined together and she was denied the FMLA. She needs to have the paperwork from the beginning of this past January to next January filled out and faxed back as this has been denied and she could loose her job. She asked this be done ASAP please.

## 2019-06-03 NOTE — TELEPHONE ENCOUNTER
Spoke with patient. She said that her employer is denying her fmla. States that they are getting it mixed up with the fall she had in April. Advised patient to have them give us a call so we can figure out what they need us to do so she can have it approved.

## 2019-06-12 ENCOUNTER — E-VISIT (OUTPATIENT)
Dept: FAMILY MEDICINE CLINIC | Age: 64
End: 2019-06-12
Payer: COMMERCIAL

## 2019-06-12 PROCEDURE — 98969 PR NONPHYSICIAN ONLINE ASSESSMENT AND MANAGEMENT: CPT | Performed by: NURSE PRACTITIONER

## 2019-06-12 ASSESSMENT — LIFESTYLE VARIABLES
SMOKING_STATUS: NO, I'M A FORMER SMOKER
PACKS_PER_DAY: 1
SMOKING_YEARS: 15

## 2019-06-13 RX ORDER — AZITHROMYCIN 250 MG/1
TABLET, FILM COATED ORAL
Qty: 6 TABLET | Refills: 0 | Status: SHIPPED | OUTPATIENT
Start: 2019-06-13 | End: 2019-09-24 | Stop reason: ALTCHOICE

## 2019-06-14 ENCOUNTER — TELEPHONE (OUTPATIENT)
Dept: FAMILY MEDICINE CLINIC | Age: 64
End: 2019-06-14

## 2019-06-14 NOTE — TELEPHONE ENCOUNTER
Patient missed Tuesday, today and a couple of days last week and would like someone to call her about updating her [place] paperwork.

## 2019-06-14 NOTE — TELEPHONE ENCOUNTER
Verbal from Dr GALLOWAY - rick will not update FMLA anymore than already says. Patient informed. Acknowledged understanding.

## 2019-06-24 RX ORDER — AMLODIPINE BESYLATE 10 MG/1
TABLET ORAL
Qty: 90 TABLET | Refills: 0 | Status: SHIPPED | OUTPATIENT
Start: 2019-06-24 | End: 2019-09-24 | Stop reason: ALTCHOICE

## 2019-06-24 RX ORDER — LISINOPRIL 40 MG/1
TABLET ORAL
Qty: 90 TABLET | Refills: 0 | Status: SHIPPED | OUTPATIENT
Start: 2019-06-24 | End: 2019-09-27

## 2019-06-25 ENCOUNTER — TELEPHONE (OUTPATIENT)
Dept: FAMILY MEDICINE CLINIC | Age: 64
End: 2019-06-25

## 2019-06-25 NOTE — LETTER
Patient Name: Radha Baumann  [birth year]      [facility]  Certification of Health Care Provider for  [facility] Serious Health Condition  (Family and Medical Leave Act)    Attached please find the applicable completed [address] [place] [address] [facility]) certification form. [address] [facility] certification forms (W-380-E, W-380-F, [id number], & WH-385-V). Accordingly, the attached form complies in all material respects with applicable [facility] regulations and is being provided in lieu of any certification forms submitted to [facility]) by the Employer. Provider's name: Milena Snow MD      [facility] PRIMARY CARE  [facility] [address] [address]  Dept: [phone]  Dept Fax: [phone]  Loc: [phone]    PART A: MEDICAL FACTS  1. Approximate date condition commenced: 1/1/19. Probable duration of condition: lifetime. Scout below as applicable:  Was the patient admitted for an overnight stay in a hospital, hospice, or residential medical     care facility? No  If so, dates of admission: N/A. Date(s) you treated the patient for condition: 5/13/19. Will the patient need to have treatment visits at least twice per year due to the condition? Yes. Was medication, other than over-the-counter medication, prescribed? No     Was the patient referred to other health care provider(s) for evaluation or treatment (e.g.,            physical therapist)? Yes. If so, state the nature of such treatments and expected          duration of treatment: physical therapy. 2. Is the medical condition pregnancy? No. If so, expected delivery date: N/A.    3. Use any information provided by the employer to answer this question. If the employer fails to provide a list of the employee's essential functions or a job description, answer these             questions based upon the employee's own description of his/her job functions. condition, estimate the frequency of flare-ups and the duration of related incapacity that the patient may have over the next 6 months (e.g., 1 episode every 3 months lasting 1-2 days): 2-3 days/week per episode, 2-3 episodes/month      ___ ____  Anyi Farooq  5/13/19

## 2019-06-26 NOTE — TELEPHONE ENCOUNTER
Patient states that the [facility] letter needs to state 2-3 WHOLE days a week, not just a couple of hours. She says that she needs it, because she does not know how she is going to feel when she wakes up in the mornings and may not be able to go in to work.

## 2019-07-01 NOTE — TELEPHONE ENCOUNTER
FMLA faxed. Left message for patient to call back. Wanted to inform her forms were faxed, and any additional changes would need an office visit.

## 2019-07-15 ENCOUNTER — TELEPHONE (OUTPATIENT)
Dept: FAMILY MEDICINE CLINIC | Age: 64
End: 2019-07-15

## 2019-09-09 RX ORDER — SIMVASTATIN 20 MG
TABLET ORAL
Qty: 90 TABLET | Refills: 0 | Status: SHIPPED | OUTPATIENT
Start: 2019-09-09 | End: 2019-09-19 | Stop reason: SDUPTHER

## 2019-09-09 RX ORDER — ATENOLOL 100 MG/1
TABLET ORAL
Qty: 90 TABLET | Refills: 0 | Status: SHIPPED | OUTPATIENT
Start: 2019-09-09 | End: 2019-09-24 | Stop reason: ALTCHOICE

## 2019-09-17 ENCOUNTER — TELEPHONE (OUTPATIENT)
Dept: FAMILY MEDICINE CLINIC | Age: 64
End: 2019-09-17

## 2019-09-19 RX ORDER — SIMVASTATIN 20 MG
20 TABLET ORAL NIGHTLY
Qty: 90 TABLET | Refills: 1 | Status: SHIPPED | OUTPATIENT
Start: 2019-09-19 | End: 2020-03-16

## 2019-09-19 RX ORDER — SPIRONOLACTONE 25 MG/1
25 TABLET ORAL DAILY
Qty: 7 TABLET | Refills: 0 | Status: SHIPPED | OUTPATIENT
Start: 2019-09-19 | End: 2019-09-27 | Stop reason: SDUPTHER

## 2019-09-19 RX ORDER — SPIRONOLACTONE 25 MG/1
25 TABLET ORAL DAILY
COMMUNITY
Start: 2019-07-25 | End: 2019-09-19 | Stop reason: SDUPTHER

## 2019-09-19 RX ORDER — DILTIAZEM HYDROCHLORIDE 120 MG/1
120 CAPSULE, COATED, EXTENDED RELEASE ORAL DAILY
Qty: 7 CAPSULE | Refills: 0 | Status: SHIPPED | OUTPATIENT
Start: 2019-09-19 | End: 2019-09-27 | Stop reason: SDUPTHER

## 2019-09-19 RX ORDER — DILTIAZEM HYDROCHLORIDE 120 MG/1
120 CAPSULE, COATED, EXTENDED RELEASE ORAL DAILY
COMMUNITY
Start: 2019-07-25 | End: 2019-09-19 | Stop reason: SDUPTHER

## 2019-09-20 ENCOUNTER — TELEPHONE (OUTPATIENT)
Dept: FAMILY MEDICINE CLINIC | Age: 64
End: 2019-09-20

## 2019-09-23 ENCOUNTER — TELEPHONE (OUTPATIENT)
Dept: FAMILY MEDICINE CLINIC | Age: 64
End: 2019-09-23

## 2019-09-24 ENCOUNTER — OFFICE VISIT (OUTPATIENT)
Dept: FAMILY MEDICINE CLINIC | Age: 64
End: 2019-09-24
Payer: COMMERCIAL

## 2019-09-24 ENCOUNTER — TELEPHONE (OUTPATIENT)
Dept: FAMILY MEDICINE CLINIC | Age: 64
End: 2019-09-24

## 2019-09-24 VITALS
OXYGEN SATURATION: 96 % | SYSTOLIC BLOOD PRESSURE: 138 MMHG | HEIGHT: 62 IN | DIASTOLIC BLOOD PRESSURE: 82 MMHG | BODY MASS INDEX: 53.92 KG/M2 | HEART RATE: 106 BPM | WEIGHT: 293 LBS

## 2019-09-24 DIAGNOSIS — J44.9 CHRONIC OBSTRUCTIVE PULMONARY DISEASE, UNSPECIFIED COPD TYPE (HCC): Primary | ICD-10-CM

## 2019-09-24 DIAGNOSIS — R79.89 ELEVATED TSH: ICD-10-CM

## 2019-09-24 DIAGNOSIS — I10 BENIGN HYPERTENSION: ICD-10-CM

## 2019-09-24 DIAGNOSIS — R73.03 PRE-DIABETES: ICD-10-CM

## 2019-09-24 PROBLEM — J96.12 CHRONIC RESPIRATORY FAILURE WITH HYPERCAPNIA (HCC): Status: ACTIVE | Noted: 2019-08-28

## 2019-09-24 PROBLEM — I48.0 PAROXYSMAL ATRIAL FIBRILLATION (HCC): Status: ACTIVE | Noted: 2019-07-11

## 2019-09-24 LAB
HCT VFR BLD CALC: 38.9 % (ref 36–48)
HEMOGLOBIN: 12.8 G/DL (ref 12–16)
MCH RBC QN AUTO: 28 PG (ref 26–34)
MCHC RBC AUTO-ENTMCNC: 32.9 G/DL (ref 31–36)
MCV RBC AUTO: 85.1 FL (ref 80–100)
PDW BLD-RTO: 17.6 % (ref 12.4–15.4)
PLATELET # BLD: 281 K/UL (ref 135–450)
PMV BLD AUTO: 9.8 FL (ref 5–10.5)
RBC # BLD: 4.57 M/UL (ref 4–5.2)
WBC # BLD: 8.3 K/UL (ref 4–11)

## 2019-09-24 PROCEDURE — 99214 OFFICE O/P EST MOD 30 MIN: CPT | Performed by: FAMILY MEDICINE

## 2019-09-24 RX ORDER — DILTIAZEM HYDROCHLORIDE 120 MG/1
120 CAPSULE, COATED, EXTENDED RELEASE ORAL DAILY
Qty: 7 CAPSULE | Refills: 1 | OUTPATIENT
Start: 2019-09-24

## 2019-09-24 RX ORDER — ACETAMINOPHEN 325 MG/1
650 TABLET ORAL EVERY 4 HOURS PRN
COMMUNITY
End: 2019-09-27

## 2019-09-24 RX ORDER — POLYETHYLENE GLYCOL 3350 17 G/17G
17 POWDER, FOR SOLUTION ORAL DAILY PRN
COMMUNITY
Start: 2019-07-24

## 2019-09-24 RX ORDER — LANOLIN ALCOHOL/MO/W.PET/CERES
3 CREAM (GRAM) TOPICAL NIGHTLY PRN
COMMUNITY
End: 2019-09-27

## 2019-09-24 RX ORDER — PANTOPRAZOLE SODIUM 40 MG/1
40 TABLET, DELAYED RELEASE ORAL DAILY
COMMUNITY
Start: 2019-07-25 | End: 2019-09-27 | Stop reason: SDUPTHER

## 2019-09-24 RX ORDER — SPIRONOLACTONE 25 MG/1
25 TABLET ORAL DAILY
Qty: 7 TABLET | Refills: 1 | OUTPATIENT
Start: 2019-09-24

## 2019-09-24 RX ORDER — METOPROLOL TARTRATE 50 MG/1
50 TABLET, FILM COATED ORAL 2 TIMES DAILY
COMMUNITY
Start: 2019-07-24 | End: 2019-09-27 | Stop reason: SDUPTHER

## 2019-09-24 RX ORDER — TRAMADOL HYDROCHLORIDE 50 MG/1
50 TABLET ORAL EVERY 6 HOURS PRN
COMMUNITY

## 2019-09-24 RX ORDER — AMIODARONE HYDROCHLORIDE 200 MG/1
200 TABLET ORAL DAILY
COMMUNITY
End: 2019-09-27 | Stop reason: ALTCHOICE

## 2019-09-24 RX ORDER — METAXALONE 800 MG/1
800 TABLET ORAL 3 TIMES DAILY
COMMUNITY

## 2019-09-24 RX ORDER — TORSEMIDE 10 MG/1
50 TABLET ORAL 2 TIMES DAILY
COMMUNITY
Start: 2019-07-24 | End: 2019-09-27 | Stop reason: SDUPTHER

## 2019-09-25 ENCOUNTER — TELEPHONE (OUTPATIENT)
Dept: FAMILY MEDICINE CLINIC | Age: 64
End: 2019-09-25

## 2019-09-25 LAB
ANION GAP SERPL CALCULATED.3IONS-SCNC: 24 MMOL/L (ref 3–16)
BUN BLDV-MCNC: 28 MG/DL (ref 7–20)
CALCIUM SERPL-MCNC: 10.4 MG/DL (ref 8.3–10.6)
CHLORIDE BLD-SCNC: 88 MMOL/L (ref 99–110)
CO2: 24 MMOL/L (ref 21–32)
CREAT SERPL-MCNC: 1.5 MG/DL (ref 0.6–1.2)
ESTIMATED AVERAGE GLUCOSE: 114 MG/DL
GFR AFRICAN AMERICAN: 42
GFR NON-AFRICAN AMERICAN: 35
GLUCOSE BLD-MCNC: 126 MG/DL (ref 70–99)
HBA1C MFR BLD: 5.6 %
POTASSIUM SERPL-SCNC: 3.9 MMOL/L (ref 3.5–5.1)
SODIUM BLD-SCNC: 136 MMOL/L (ref 136–145)
T4 FREE: 1.6 NG/DL (ref 0.9–1.8)
TSH REFLEX: 12.3 UIU/ML (ref 0.27–4.2)

## 2019-09-25 NOTE — TELEPHONE ENCOUNTER
Please fax over the most recent office notes to Pascual Dyer at [phone]. At least the last 30-90 days. The most recent OV should be enough though.

## 2019-09-26 ENCOUNTER — TELEPHONE (OUTPATIENT)
Dept: FAMILY MEDICINE CLINIC | Age: 64
End: 2019-09-26

## 2019-09-26 ASSESSMENT — ENCOUNTER SYMPTOMS: SHORTNESS OF BREATH: 1

## 2019-09-27 RX ORDER — TORSEMIDE 10 MG/1
50 TABLET ORAL 2 TIMES DAILY
Qty: 150 TABLET | Refills: 3 | Status: SHIPPED | OUTPATIENT
Start: 2019-09-27 | End: 2019-10-07 | Stop reason: SDUPTHER

## 2019-09-27 RX ORDER — DILTIAZEM HYDROCHLORIDE 120 MG/1
120 CAPSULE, COATED, EXTENDED RELEASE ORAL DAILY
Qty: 30 CAPSULE | Refills: 3 | Status: SHIPPED | OUTPATIENT
Start: 2019-09-27 | End: 2020-01-14

## 2019-09-27 RX ORDER — CITALOPRAM 20 MG/1
20 TABLET ORAL DAILY
Qty: 30 TABLET | Refills: 3 | Status: SHIPPED | OUTPATIENT
Start: 2019-09-27 | End: 2019-11-19 | Stop reason: SDUPTHER

## 2019-09-27 RX ORDER — LEVOTHYROXINE SODIUM 0.05 MG/1
TABLET ORAL
COMMUNITY
Start: 2019-09-19 | End: 2019-10-11 | Stop reason: SDUPTHER

## 2019-09-27 RX ORDER — METOPROLOL TARTRATE 50 MG/1
50 TABLET, FILM COATED ORAL 2 TIMES DAILY
Qty: 60 TABLET | Refills: 3 | Status: SHIPPED | OUTPATIENT
Start: 2019-09-27 | End: 2019-11-19 | Stop reason: SDUPTHER

## 2019-09-27 RX ORDER — POTASSIUM CHLORIDE 750 MG/1
10 TABLET, EXTENDED RELEASE ORAL 2 TIMES DAILY
Qty: 60 TABLET | Refills: 3 | Status: SHIPPED | OUTPATIENT
Start: 2019-09-27

## 2019-09-27 RX ORDER — PANTOPRAZOLE SODIUM 40 MG/1
40 TABLET, DELAYED RELEASE ORAL DAILY
Qty: 30 TABLET | Refills: 3 | Status: SHIPPED | OUTPATIENT
Start: 2019-09-27 | End: 2019-11-19 | Stop reason: SDUPTHER

## 2019-09-27 RX ORDER — LEVOTHYROXINE SODIUM 0.05 MG/1
50 TABLET ORAL DAILY
Qty: 30 TABLET | Refills: 5 | Status: SHIPPED | OUTPATIENT
Start: 2019-09-27 | End: 2020-04-06 | Stop reason: SDUPTHER

## 2019-09-27 RX ORDER — NYSTATIN 100000 U/G
CREAM TOPICAL
Qty: 30 G | Refills: 3 | Status: SHIPPED | OUTPATIENT
Start: 2019-09-27

## 2019-09-27 RX ORDER — SPIRONOLACTONE 25 MG/1
25 TABLET ORAL DAILY
Qty: 30 TABLET | Refills: 3 | Status: SHIPPED | OUTPATIENT
Start: 2019-09-27 | End: 2020-01-06

## 2019-09-27 RX ORDER — TRAMADOL HYDROCHLORIDE 50 MG/1
50 TABLET ORAL EVERY 6 HOURS PRN
Refills: 3 | OUTPATIENT
Start: 2019-09-27

## 2019-10-01 ENCOUNTER — TELEPHONE (OUTPATIENT)
Dept: FAMILY MEDICINE CLINIC | Age: 64
End: 2019-10-01

## 2019-10-07 RX ORDER — TORSEMIDE 10 MG/1
50 TABLET ORAL 2 TIMES DAILY
Qty: 900 TABLET | Refills: 1 | Status: SHIPPED | OUTPATIENT
Start: 2019-10-07 | End: 2020-06-24

## 2019-10-11 ENCOUNTER — TELEPHONE (OUTPATIENT)
Dept: FAMILY MEDICINE CLINIC | Age: 64
End: 2019-10-11

## 2019-10-11 RX ORDER — LEVOTHYROXINE SODIUM 0.05 MG/1
50 TABLET ORAL DAILY
Qty: 90 TABLET | Refills: 0 | Status: SHIPPED | OUTPATIENT
Start: 2019-10-11 | End: 2020-01-06

## 2019-10-18 ENCOUNTER — TELEPHONE (OUTPATIENT)
Dept: FAMILY MEDICINE CLINIC | Age: 64
End: 2019-10-18

## 2019-10-22 ENCOUNTER — TELEPHONE (OUTPATIENT)
Dept: FAMILY MEDICINE CLINIC | Age: 64
End: 2019-10-22

## 2019-10-29 ENCOUNTER — TELEPHONE (OUTPATIENT)
Dept: FAMILY MEDICINE CLINIC | Age: 64
End: 2019-10-29

## 2019-10-30 ENCOUNTER — TELEPHONE (OUTPATIENT)
Dept: FAMILY MEDICINE CLINIC | Age: 64
End: 2019-10-30

## 2019-11-05 ENCOUNTER — TELEPHONE (OUTPATIENT)
Dept: FAMILY MEDICINE CLINIC | Age: 64
End: 2019-11-05

## 2019-11-14 ENCOUNTER — TELEPHONE (OUTPATIENT)
Dept: FAMILY MEDICINE CLINIC | Age: 64
End: 2019-11-14

## 2019-11-19 RX ORDER — PANTOPRAZOLE SODIUM 40 MG/1
40 TABLET, DELAYED RELEASE ORAL DAILY
Qty: 90 TABLET | Refills: 3 | Status: SHIPPED | OUTPATIENT
Start: 2019-11-19

## 2019-11-19 RX ORDER — CITALOPRAM 20 MG/1
20 TABLET ORAL DAILY
Qty: 90 TABLET | Refills: 3 | Status: SHIPPED | OUTPATIENT
Start: 2019-11-19

## 2019-11-19 RX ORDER — METOPROLOL TARTRATE 50 MG/1
TABLET, FILM COATED ORAL
Qty: 180 TABLET | Refills: 3 | Status: SHIPPED | OUTPATIENT
Start: 2019-11-19

## 2019-12-04 RX ORDER — POTASSIUM BICARBONATE 782 MG/1
TABLET, EFFERVESCENT ORAL
Qty: 180 TABLET | Refills: 3 | Status: SHIPPED | OUTPATIENT
Start: 2019-12-04

## 2019-12-11 ENCOUNTER — OFFICE VISIT (OUTPATIENT)
Dept: FAMILY MEDICINE CLINIC | Age: 64
End: 2019-12-11
Payer: COMMERCIAL

## 2019-12-11 VITALS
HEIGHT: 62 IN | SYSTOLIC BLOOD PRESSURE: 122 MMHG | DIASTOLIC BLOOD PRESSURE: 70 MMHG | OXYGEN SATURATION: 96 % | BODY MASS INDEX: 59.99 KG/M2 | HEART RATE: 71 BPM

## 2019-12-11 DIAGNOSIS — E03.9 HYPOTHYROIDISM, UNSPECIFIED TYPE: Primary | ICD-10-CM

## 2019-12-11 DIAGNOSIS — M17.0 ARTHRITIS OF BOTH KNEES: ICD-10-CM

## 2019-12-11 DIAGNOSIS — Z23 FLU VACCINE NEED: ICD-10-CM

## 2019-12-11 DIAGNOSIS — D50.9 IRON DEFICIENCY ANEMIA, UNSPECIFIED IRON DEFICIENCY ANEMIA TYPE: ICD-10-CM

## 2019-12-11 DIAGNOSIS — F51.5 NIGHTMARE: ICD-10-CM

## 2019-12-11 PROCEDURE — 90653 IIV ADJUVANT VACCINE IM: CPT | Performed by: FAMILY MEDICINE

## 2019-12-11 PROCEDURE — 99214 OFFICE O/P EST MOD 30 MIN: CPT | Performed by: FAMILY MEDICINE

## 2019-12-11 PROCEDURE — 90471 IMMUNIZATION ADMIN: CPT | Performed by: FAMILY MEDICINE

## 2019-12-11 RX ORDER — PRAZOSIN HYDROCHLORIDE 1 MG/1
1 CAPSULE ORAL NIGHTLY
Qty: 30 CAPSULE | Refills: 3 | Status: SHIPPED | OUTPATIENT
Start: 2019-12-11 | End: 2019-12-11 | Stop reason: SDUPTHER

## 2019-12-11 RX ORDER — PRAZOSIN HYDROCHLORIDE 1 MG/1
CAPSULE ORAL
Qty: 90 CAPSULE | Refills: 3 | Status: SHIPPED | OUTPATIENT
Start: 2019-12-11

## 2019-12-11 ASSESSMENT — ENCOUNTER SYMPTOMS: RESPIRATORY NEGATIVE: 1

## 2019-12-18 ENCOUNTER — TELEPHONE (OUTPATIENT)
Dept: FAMILY MEDICINE CLINIC | Age: 64
End: 2019-12-18

## 2019-12-24 ENCOUNTER — TELEPHONE (OUTPATIENT)
Dept: FAMILY MEDICINE CLINIC | Age: 64
End: 2019-12-24

## 2019-12-24 NOTE — TELEPHONE ENCOUNTER
Additional FIT test mailed to patient as she mailed hers back over two weeks ago and we never received it.   [address] [facility] MurphyFormerly [facility] [address]

## 2020-01-06 RX ORDER — SPIRONOLACTONE 25 MG/1
25 TABLET ORAL DAILY
Qty: 30 TABLET | Refills: 3 | Status: SHIPPED | OUTPATIENT
Start: 2020-01-06 | End: 2020-05-11

## 2020-01-06 RX ORDER — LEVOTHYROXINE SODIUM 0.05 MG/1
50 TABLET ORAL DAILY
Qty: 90 TABLET | Refills: 0 | Status: SHIPPED | OUTPATIENT
Start: 2020-01-06 | End: 2020-03-30

## 2020-01-06 NOTE — TELEPHONE ENCOUNTER
Last Fill 9/27/19  Last Office Visit 12/11/19        Return in about 3 months (around 3/11/2020  No Pending Appointments

## 2020-01-08 NOTE — TELEPHONE ENCOUNTER
Left message for patient to call back. Paperwork has return to work date as 1/2/2020 as of her request 8 days ago (telephone call taken by [employer]).

## 2020-01-08 NOTE — TELEPHONE ENCOUNTER
Pt called and stated her work did not receive  [place] pp[place] for the updated leave date of March 1st. Please refax and call back at [phone]

## 2020-01-08 NOTE — TELEPHONE ENCOUNTER
Patients return to work date as of right now is 3/1/2020. Papework was due on 1/2/2019. Forms addended and faxed.

## 2020-01-15 DIAGNOSIS — D50.9 IRON DEFICIENCY ANEMIA, UNSPECIFIED IRON DEFICIENCY ANEMIA TYPE: ICD-10-CM

## 2020-01-15 DIAGNOSIS — E03.9 HYPOTHYROIDISM, UNSPECIFIED TYPE: ICD-10-CM

## 2020-01-15 LAB
ANION GAP SERPL CALCULATED.3IONS-SCNC: 17 MMOL/L (ref 3–16)
BUN BLDV-MCNC: 27 MG/DL (ref 7–20)
CALCIUM SERPL-MCNC: 9.8 MG/DL (ref 8.3–10.6)
CHLORIDE BLD-SCNC: 98 MMOL/L (ref 99–110)
CO2: 24 MMOL/L (ref 21–32)
CONTROL: POSITIVE
CREAT SERPL-MCNC: 1 MG/DL (ref 0.6–1.2)
FERRITIN: 59 NG/ML (ref 15–150)
GFR AFRICAN AMERICAN: >60
GFR NON-AFRICAN AMERICAN: 55
GLUCOSE BLD-MCNC: 101 MG/DL (ref 70–99)
HCT VFR BLD CALC: 36.2 % (ref 36–48)
HEMOCCULT STL QL: POSITIVE
HEMOGLOBIN: 11.9 G/DL (ref 12–16)
MCH RBC QN AUTO: 27.3 PG (ref 26–34)
MCHC RBC AUTO-ENTMCNC: 32.8 G/DL (ref 31–36)
MCV RBC AUTO: 83.4 FL (ref 80–100)
PDW BLD-RTO: 16.4 % (ref 12.4–15.4)
PLATELET # BLD: 205 K/UL (ref 135–450)
PMV BLD AUTO: 10.1 FL (ref 5–10.5)
POTASSIUM SERPL-SCNC: 4 MMOL/L (ref 3.5–5.1)
RBC # BLD: 4.35 M/UL (ref 4–5.2)
SODIUM BLD-SCNC: 139 MMOL/L (ref 136–145)
TSH SERPL DL<=0.05 MIU/L-ACNC: 4.18 UIU/ML (ref 0.27–4.2)
WBC # BLD: 5.3 K/UL (ref 4–11)

## 2020-01-15 PROCEDURE — 82274 ASSAY TEST FOR BLOOD FECAL: CPT | Performed by: FAMILY MEDICINE

## 2020-01-15 RX ORDER — DILTIAZEM HYDROCHLORIDE 120 MG/1
120 CAPSULE, COATED, EXTENDED RELEASE ORAL DAILY
Qty: 30 CAPSULE | Refills: 2 | Status: SHIPPED | OUTPATIENT
Start: 2020-01-15 | End: 2020-04-06

## 2020-01-17 ENCOUNTER — TELEPHONE (OUTPATIENT)
Dept: FAMILY MEDICINE CLINIC | Age: 65
End: 2020-01-17

## 2020-01-28 ENCOUNTER — TELEPHONE (OUTPATIENT)
Dept: FAMILY MEDICINE CLINIC | Age: 65
End: 2020-01-28

## 2020-03-16 RX ORDER — SIMVASTATIN 20 MG
20 TABLET ORAL NIGHTLY
Qty: 90 TABLET | Refills: 1 | Status: SHIPPED | OUTPATIENT
Start: 2020-03-16 | End: 2020-09-01 | Stop reason: SDUPTHER

## 2020-03-16 NOTE — TELEPHONE ENCOUNTER
Last OV 12/11/2019   Next OV Visit date not found    Requested Prescriptions     Pending Prescriptions Disp Refills    simvastatin (ZOCOR) 20 MG tablet [Pharmacy Med Name: SIMVASTATIN 20MG TABLETS] 90 tablet 1     Sig: TAKE 1 TABLET BY MOUTH NIGHTLY

## 2020-03-18 ENCOUNTER — TELEPHONE (OUTPATIENT)
Dept: FAMILY MEDICINE CLINIC | Age: 65
End: 2020-03-18

## 2020-03-18 NOTE — TELEPHONE ENCOUNTER
Patient called sd she has a sinus infection. Symptoms started about 2 weeks ago with all the weather changes. Lots of sinus pressure, drainage in back of throat, headache over the eyes. No other symptoms.  Would like PCP to call in antibiotic @ [address] -  [phone] Please call patient to advise

## 2020-03-30 RX ORDER — LEVOTHYROXINE SODIUM 0.05 MG/1
50 TABLET ORAL DAILY
Qty: 90 TABLET | Refills: 0 | Status: SHIPPED | OUTPATIENT
Start: 2020-03-30 | End: 2020-06-24

## 2020-03-30 NOTE — TELEPHONE ENCOUNTER
Last OV 12/11/2019   Next OV Visit date not found    Requested Prescriptions     Pending Prescriptions Disp Refills    levothyroxine (SYNTHROID) 50 MCG tablet [Pharmacy Med Name: LEVOTHYROXINE 0.05MG (50MCG) TAB] 90 tablet 0     Sig: TAKE 1 TABLET BY MOUTH DAILY

## 2020-04-06 RX ORDER — DILTIAZEM HYDROCHLORIDE 120 MG/1
120 CAPSULE, COATED, EXTENDED RELEASE ORAL DAILY
Qty: 30 CAPSULE | Refills: 2 | Status: SHIPPED | OUTPATIENT
Start: 2020-04-06 | End: 2020-05-11

## 2020-05-11 RX ORDER — SPIRONOLACTONE 25 MG/1
25 TABLET ORAL DAILY
Qty: 30 TABLET | Refills: 3 | Status: SHIPPED | OUTPATIENT
Start: 2020-05-11 | End: 2020-09-01 | Stop reason: SDUPTHER

## 2020-05-11 RX ORDER — DILTIAZEM HYDROCHLORIDE 120 MG/1
120 CAPSULE, COATED, EXTENDED RELEASE ORAL DAILY
Qty: 30 CAPSULE | Refills: 2 | Status: SHIPPED | OUTPATIENT
Start: 2020-05-11 | End: 2020-08-24

## 2020-06-24 RX ORDER — TORSEMIDE 10 MG/1
TABLET ORAL
Qty: 900 TABLET | Refills: 1 | Status: SHIPPED | OUTPATIENT
Start: 2020-06-24 | End: 2021-03-04 | Stop reason: SDUPTHER

## 2020-06-24 RX ORDER — LEVOTHYROXINE SODIUM 0.05 MG/1
50 TABLET ORAL DAILY
Qty: 90 TABLET | Refills: 0 | Status: SHIPPED | OUTPATIENT
Start: 2020-06-24 | End: 2020-09-02 | Stop reason: SDUPTHER

## 2020-06-24 NOTE — TELEPHONE ENCOUNTER
Last OV 12/11/2019   Return in about 3 months (around 3/11/2020).   Next OV Visit date not found  [facility]

## 2020-07-27 ENCOUNTER — TELEPHONE (OUTPATIENT)
Dept: FAMILY MEDICINE CLINIC | Age: 65
End: 2020-07-27

## 2020-07-27 ENCOUNTER — E-VISIT (OUTPATIENT)
Dept: FAMILY MEDICINE CLINIC | Age: 65
End: 2020-07-27
Payer: COMMERCIAL

## 2020-07-27 PROCEDURE — 99421 OL DIG E/M SVC 5-10 MIN: CPT | Performed by: FAMILY MEDICINE

## 2020-07-27 RX ORDER — FLUTICASONE PROPIONATE 50 MCG
2 SPRAY, SUSPENSION (ML) NASAL DAILY
Qty: 1 BOTTLE | Refills: 3 | Status: SHIPPED | OUTPATIENT
Start: 2020-07-27 | End: 2022-10-17

## 2020-07-27 RX ORDER — ECHINACEA PURPUREA EXTRACT 125 MG
2 TABLET ORAL PRN
Qty: 1 BOTTLE | Refills: 3 | Status: SHIPPED | OUTPATIENT
Start: 2020-07-27 | End: 2020-08-26

## 2020-07-27 RX ORDER — AMOXICILLIN AND CLAVULANATE POTASSIUM 875; 125 MG/1; MG/1
1 TABLET, FILM COATED ORAL 2 TIMES DAILY
Qty: 14 TABLET | Refills: 0 | Status: SHIPPED | OUTPATIENT
Start: 2020-07-27 | End: 2020-08-03

## 2020-07-27 ASSESSMENT — LIFESTYLE VARIABLES
SMOKING_STATUS: NO, I'M A FORMER SMOKER
SMOKING_YEARS: 17
PACKS_PER_DAY: 0

## 2020-07-27 NOTE — PROGRESS NOTES
HPI: per patient's questionnaire    EXAM: not applicable    Diagnoses and all orders for this visit:    1. Acute bacterial sinusitis  worsening    - fluticasone (FLONASE) 50 MCG/ACT nasal spray; 2 sprays by Nasal route daily  Dispense: 1 Bottle; Refill: 3  - sodium chloride (ALTAMIST SPRAY) 0.65 % nasal spray; 2 sprays by Nasal route as needed for Congestion (Q 2-3 HOURS prn)  Dispense: 1 Bottle; Refill: 3  - amoxicillin-clavulanate (AUGMENTIN) 875-125 MG per tablet; Take 1 tablet by mouth 2 times daily for 7 days  Dispense: 14 tablet; Refill: 0      Patient was advised to contact PCP if symptoms worsen or failing to change as expected    5-10 minutes were spent on the digital evaluation and management of this patient.

## 2020-07-27 NOTE — TELEPHONE ENCOUNTER
Left message for pt to call and scheduled vv if she is able or in office for possible sinus infection, must be seen for ABX

## 2020-07-28 NOTE — TELEPHONE ENCOUNTER
Patient completed E-visit 7/27. States that she was given abx. Advised to call back to schedule virtual visit if her symptoms are getting better. Patient acknowledged understanding.

## 2020-08-24 RX ORDER — DILTIAZEM HYDROCHLORIDE 120 MG/1
120 CAPSULE, COATED, EXTENDED RELEASE ORAL DAILY
Qty: 30 CAPSULE | Refills: 2 | Status: SHIPPED | OUTPATIENT
Start: 2020-08-24 | End: 2020-09-01 | Stop reason: SDUPTHER

## 2020-08-24 NOTE — TELEPHONE ENCOUNTER
Last Fill 5/11/20  Last Office Visit 12/11/19   Return in about 3 months (around 3/11/2020).    No Pending Appointments

## 2020-09-01 NOTE — TELEPHONE ENCOUNTER
New Rx's for [facility] needed. Send 90 Day Supply of :      Levothyroxine  Eliquis  Simvastatin  Spironolactone  Diltiazem    Please cxl previous Rx for Diltiazem sent to [facility].

## 2020-09-01 NOTE — TELEPHONE ENCOUNTER
Last OV 12/11/2019   Next OV Visit date not found    Requested Prescriptions     Pending Prescriptions Disp Refills    levothyroxine (SYNTHROID) 50 MCG tablet 90 tablet 0     Sig: Take 1 tablet by mouth Daily    apixaban (ELIQUIS) 5 MG TABS tablet 180 tablet 0     Sig: Take 1 tablet by mouth 2 times daily    simvastatin (ZOCOR) 20 MG tablet 90 tablet 0     Sig: Take 1 tablet by mouth nightly    spironolactone (ALDACTONE) 25 MG tablet 90 tablet 0     Sig: Take 1 tablet by mouth daily    dilTIAZem (CARDIZEM CD) 120 MG extended release capsule 90 capsule 0     Sig: Take 1 capsule by mouth daily Do not crush or break.

## 2020-09-02 RX ORDER — LEVOTHYROXINE SODIUM 0.05 MG/1
50 TABLET ORAL DAILY
Qty: 90 TABLET | Refills: 0 | Status: SHIPPED | OUTPATIENT
Start: 2020-09-02 | End: 2020-10-06

## 2020-09-02 RX ORDER — DILTIAZEM HYDROCHLORIDE 120 MG/1
120 CAPSULE, COATED, EXTENDED RELEASE ORAL DAILY
Qty: 90 CAPSULE | Refills: 0 | Status: SHIPPED | OUTPATIENT
Start: 2020-09-02 | End: 2020-10-19

## 2020-09-02 RX ORDER — SIMVASTATIN 20 MG
20 TABLET ORAL NIGHTLY
Qty: 90 TABLET | Refills: 0 | Status: SHIPPED | OUTPATIENT
Start: 2020-09-02 | End: 2020-10-06

## 2020-09-02 RX ORDER — SPIRONOLACTONE 25 MG/1
25 TABLET ORAL DAILY
Qty: 90 TABLET | Refills: 0 | Status: SHIPPED | OUTPATIENT
Start: 2020-09-02 | End: 2020-09-10

## 2020-09-10 RX ORDER — SPIRONOLACTONE 25 MG/1
25 TABLET ORAL DAILY
Qty: 30 TABLET | Refills: 2 | Status: SHIPPED | OUTPATIENT
Start: 2020-09-10 | End: 2020-10-06

## 2020-10-06 RX ORDER — SIMVASTATIN 20 MG
20 TABLET ORAL NIGHTLY
Qty: 90 TABLET | Refills: 1 | Status: SHIPPED | OUTPATIENT
Start: 2020-10-06 | End: 2021-03-04 | Stop reason: SDUPTHER

## 2020-10-06 RX ORDER — SPIRONOLACTONE 25 MG/1
25 TABLET ORAL DAILY
Qty: 90 TABLET | Refills: 1 | Status: SHIPPED | OUTPATIENT
Start: 2020-10-06 | End: 2021-03-04 | Stop reason: SDUPTHER

## 2020-10-06 RX ORDER — LEVOTHYROXINE SODIUM 0.05 MG/1
50 TABLET ORAL DAILY
Qty: 90 TABLET | Refills: 1 | Status: SHIPPED | OUTPATIENT
Start: 2020-10-06 | End: 2021-03-04 | Stop reason: SDUPTHER

## 2020-10-06 NOTE — TELEPHONE ENCOUNTER
Last Fill 9/2/20  Spironolactone - 9/10/20  Last Office Visit 12/11/19   Return in about 3 months (around 3/11/2020).    No Pending Appointments

## 2020-10-19 RX ORDER — DILTIAZEM HYDROCHLORIDE 120 MG/1
120 CAPSULE, COATED, EXTENDED RELEASE ORAL DAILY
Qty: 90 CAPSULE | Refills: 1 | Status: SHIPPED | OUTPATIENT
Start: 2020-10-19 | End: 2021-03-04 | Stop reason: SDUPTHER

## 2020-10-19 NOTE — TELEPHONE ENCOUNTER
Last Fill 9/2/20  Last Office Visit 12/11/19   Return in about 3 months (around 3/11/2020).    No Pending Appointments

## 2020-10-30 ENCOUNTER — TELEPHONE (OUTPATIENT)
Dept: FAMILY MEDICINE CLINIC | Age: 65
End: 2020-10-30

## 2020-11-10 ENCOUNTER — OFFICE VISIT (OUTPATIENT)
Dept: FAMILY MEDICINE CLINIC | Age: 65
End: 2020-11-10
Payer: MEDICARE

## 2020-11-10 VITALS
BODY MASS INDEX: 52.81 KG/M2 | WEIGHT: 287 LBS | OXYGEN SATURATION: 98 % | TEMPERATURE: 98 F | HEART RATE: 63 BPM | SYSTOLIC BLOOD PRESSURE: 128 MMHG | HEIGHT: 62 IN | DIASTOLIC BLOOD PRESSURE: 70 MMHG

## 2020-11-10 DIAGNOSIS — I10 BENIGN HYPERTENSION: ICD-10-CM

## 2020-11-10 DIAGNOSIS — I48.0 PAROXYSMAL ATRIAL FIBRILLATION (HCC): ICD-10-CM

## 2020-11-10 DIAGNOSIS — E03.9 HYPOTHYROIDISM, UNSPECIFIED TYPE: ICD-10-CM

## 2020-11-10 DIAGNOSIS — R73.03 PRE-DIABETES: ICD-10-CM

## 2020-11-10 PROBLEM — E66.01 MORBIDLY OBESE (HCC): Status: ACTIVE | Noted: 2020-11-10

## 2020-11-10 LAB
A/G RATIO: 1.5 (ref 1.1–2.2)
ALBUMIN SERPL-MCNC: 4.2 G/DL (ref 3.4–5)
ALP BLD-CCNC: 43 U/L (ref 40–129)
ALT SERPL-CCNC: 9 U/L (ref 10–40)
ANION GAP SERPL CALCULATED.3IONS-SCNC: 14 MMOL/L (ref 3–16)
AST SERPL-CCNC: 12 U/L (ref 15–37)
BILIRUB SERPL-MCNC: 0.4 MG/DL (ref 0–1)
BUN BLDV-MCNC: 23 MG/DL (ref 7–20)
CALCIUM SERPL-MCNC: 9.7 MG/DL (ref 8.3–10.6)
CHLORIDE BLD-SCNC: 104 MMOL/L (ref 99–110)
CHOLESTEROL, TOTAL: 151 MG/DL (ref 0–199)
CO2: 23 MMOL/L (ref 21–32)
CREAT SERPL-MCNC: 0.7 MG/DL (ref 0.6–1.2)
GFR AFRICAN AMERICAN: >60
GFR NON-AFRICAN AMERICAN: >60
GLOBULIN: 2.8 G/DL
GLUCOSE BLD-MCNC: 103 MG/DL (ref 70–99)
HCT VFR BLD CALC: 37.1 % (ref 36–48)
HDLC SERPL-MCNC: 44 MG/DL (ref 40–60)
HEMOGLOBIN: 12.3 G/DL (ref 12–16)
LDL CHOLESTEROL CALCULATED: 84 MG/DL
MCH RBC QN AUTO: 28.1 PG (ref 26–34)
MCHC RBC AUTO-ENTMCNC: 33.1 G/DL (ref 31–36)
MCV RBC AUTO: 85 FL (ref 80–100)
PDW BLD-RTO: 16 % (ref 12.4–15.4)
PLATELET # BLD: 183 K/UL (ref 135–450)
PMV BLD AUTO: 9.7 FL (ref 5–10.5)
POTASSIUM SERPL-SCNC: 4.5 MMOL/L (ref 3.5–5.1)
RBC # BLD: 4.37 M/UL (ref 4–5.2)
SODIUM BLD-SCNC: 141 MMOL/L (ref 136–145)
TOTAL PROTEIN: 7 G/DL (ref 6.4–8.2)
TRIGL SERPL-MCNC: 114 MG/DL (ref 0–150)
TSH SERPL DL<=0.05 MIU/L-ACNC: 2.28 UIU/ML (ref 0.27–4.2)
VLDLC SERPL CALC-MCNC: 23 MG/DL
WBC # BLD: 5.7 K/UL (ref 4–11)

## 2020-11-10 PROCEDURE — 3288F FALL RISK ASSESSMENT DOCD: CPT | Performed by: FAMILY MEDICINE

## 2020-11-10 PROCEDURE — 3023F SPIROM DOC REV: CPT | Performed by: FAMILY MEDICINE

## 2020-11-10 PROCEDURE — G8926 SPIRO NO PERF OR DOC: HCPCS | Performed by: FAMILY MEDICINE

## 2020-11-10 PROCEDURE — G8427 DOCREV CUR MEDS BY ELIG CLIN: HCPCS | Performed by: FAMILY MEDICINE

## 2020-11-10 PROCEDURE — G8484 FLU IMMUNIZE NO ADMIN: HCPCS | Performed by: FAMILY MEDICINE

## 2020-11-10 PROCEDURE — G8417 CALC BMI ABV UP PARAM F/U: HCPCS | Performed by: FAMILY MEDICINE

## 2020-11-10 PROCEDURE — G8510 SCR DEP NEG, NO PLAN REQD: HCPCS | Performed by: FAMILY MEDICINE

## 2020-11-10 PROCEDURE — 1123F ACP DISCUSS/DSCN MKR DOCD: CPT | Performed by: FAMILY MEDICINE

## 2020-11-10 PROCEDURE — G8399 PT W/DXA RESULTS DOCUMENT: HCPCS | Performed by: FAMILY MEDICINE

## 2020-11-10 PROCEDURE — 1036F TOBACCO NON-USER: CPT | Performed by: FAMILY MEDICINE

## 2020-11-10 PROCEDURE — 99214 OFFICE O/P EST MOD 30 MIN: CPT | Performed by: FAMILY MEDICINE

## 2020-11-10 PROCEDURE — 1090F PRES/ABSN URINE INCON ASSESS: CPT | Performed by: FAMILY MEDICINE

## 2020-11-10 PROCEDURE — 4040F PNEUMOC VAC/ADMIN/RCVD: CPT | Performed by: FAMILY MEDICINE

## 2020-11-10 PROCEDURE — 3017F COLORECTAL CA SCREEN DOC REV: CPT | Performed by: FAMILY MEDICINE

## 2020-11-10 ASSESSMENT — ENCOUNTER SYMPTOMS: RESPIRATORY NEGATIVE: 1

## 2020-11-10 ASSESSMENT — PATIENT HEALTH QUESTIONNAIRE - PHQ9
SUM OF ALL RESPONSES TO PHQ9 QUESTIONS 1 & 2: 1
2. FEELING DOWN, DEPRESSED OR HOPELESS: 1
SUM OF ALL RESPONSES TO PHQ QUESTIONS 1-9: 1
1. LITTLE INTEREST OR PLEASURE IN DOING THINGS: 0
SUM OF ALL RESPONSES TO PHQ QUESTIONS 1-9: 1
SUM OF ALL RESPONSES TO PHQ QUESTIONS 1-9: 1

## 2020-11-10 NOTE — PROGRESS NOTES
Subjective:      Patient ID: Libertad Castillo is a 76 y.o. female. HPI   Pt is a of 76 y.o. female comes in today with   Chief Complaint   Patient presents with    Follow-up     Just saw pulmonology for copd and KENNETH  On BIPAP. Saw cardiology 2 weeks ago. A lot of pain in the knees. Takes advil 1-2/day. Following with ortho. Scheduled for symvisc. Has been losing wt. Lots of fiber and water. No sweets. Past Medical History:Reviewed  Medications:Reviewed. Allergies   Allergen Reactions    Latex Hives and Shortness Of Breath    Adhesive Tape Hives and Shortness Of Breath    Contrast [Iodides] Rash      Social hx:Reviewed. Social History     Tobacco Use   Smoking Status Former Smoker    Packs/day: 1.00    Years: 10.00    Pack years: 10.00    Last attempt to quit: 1987    Years since quittin.9   Smokeless Tobacco Never Used       Vitals:    11/10/20 1039   BP: 128/70   Site: Left Lower Arm   Position: Sitting   Cuff Size: Medium Adult   Pulse: 63   Temp: 98 °F (36.7 °C)   TempSrc: Temporal   SpO2: 98%   Weight: 287 lb (130.2 kg)   Height: 5' 2\" (1.575 m)      Review of Systems   Constitutional: Negative. Respiratory: Negative. Objective:   Physical Exam  Constitutional:       General: She is not in acute distress. Appearance: She is well-developed. She is obese. She is not diaphoretic. HENT:      Head: Normocephalic and atraumatic. Eyes:      General: No scleral icterus. Neck:      Musculoskeletal: Normal range of motion and neck supple. Thyroid: No thyroid mass or thyromegaly. Trachea: No tracheal deviation. Cardiovascular:      Rate and Rhythm: Normal rate and regular rhythm. Heart sounds: Normal heart sounds. No murmur. Pulmonary:      Effort: Pulmonary effort is normal.      Breath sounds: Normal breath sounds. Lymphadenopathy:      Head:      Right side of head: No submandibular adenopathy.       Left side of head: No submandibular adenopathy. Cervical: No cervical adenopathy. Skin:     General: Skin is warm and dry. Findings: No rash. Neurological:      Mental Status: She is alert and oriented to person, place, and time. Cranial Nerves: No cranial nerve deficit. Psychiatric:         Behavior: Behavior normal.         Thought Content: Thought content normal.         Judgment: Judgment normal.         Assessment:       Diagnosis Orders   1. Chronic obstructive pulmonary disease, unspecified COPD type ([address]     2. Pre-diabetes  Hemoglobin A1C   3. Paroxysmal atrial fibrillation (HCC)  CBC   4. Benign hypertension  Lipid Panel    Comprehensive Metabolic Panel    CBC   5. Hypothyroidism, unspecified type  TSH without Reflex   6. Hyperparathyroidism ([address]     7. Morbidly obese ([address]            Plan:      Carl Isaac was seen today for follow-up. Diagnoses and all orders for this visit:    Chronic obstructive pulmonary disease, unspecified COPD type ([address]  Stable; following with pulmonology. On bipap  Pre-diabetes  -     Hemoglobin A1C; Future  Due for bloodwork to see if stable. Paroxysmal atrial fibrillation (HCC)  -     CBC; Future  Stable. Following with cards  Benign hypertension  -     Lipid Panel; Future  -     Comprehensive Metabolic Panel; Future  -     CBC; Future  The current medical regimen is effective;  continue present plan and medications. Hypothyroidism, unspecified type  -     TSH without Reflex; Future  Stable on levothyroxine  Hyperparathyroidism ([address]  Stable s/p parathyroidectomy. Morbidly obese (HCC)  Watching diet.  Has done a good job with wt loss           Keegan Timmons MD

## 2020-11-11 LAB
ESTIMATED AVERAGE GLUCOSE: 119.8 MG/DL
HBA1C MFR BLD: 5.8 %

## 2020-11-19 ENCOUNTER — TELEPHONE (OUTPATIENT)
Dept: FAMILY MEDICINE CLINIC | Age: 65
End: 2020-11-19

## 2020-11-20 RX ORDER — WHEELCHAIR
EACH MISCELLANEOUS
Qty: 1 EACH | Refills: 0 | Status: SHIPPED | OUTPATIENT
Start: 2020-11-20

## 2021-03-04 RX ORDER — DILTIAZEM HYDROCHLORIDE 120 MG/1
120 CAPSULE, COATED, EXTENDED RELEASE ORAL DAILY
Qty: 90 CAPSULE | Refills: 1 | Status: SHIPPED | OUTPATIENT
Start: 2021-03-04 | End: 2021-07-20

## 2021-03-04 RX ORDER — APIXABAN 5 MG/1
TABLET, FILM COATED ORAL
Qty: 180 TABLET | Refills: 3 | Status: SHIPPED | OUTPATIENT
Start: 2021-03-04

## 2021-03-04 RX ORDER — LEVOTHYROXINE SODIUM 0.05 MG/1
50 TABLET ORAL DAILY
Qty: 90 TABLET | Refills: 1 | Status: SHIPPED | OUTPATIENT
Start: 2021-03-04 | End: 2021-07-20

## 2021-03-04 RX ORDER — SPIRONOLACTONE 25 MG/1
25 TABLET ORAL DAILY
Qty: 90 TABLET | Refills: 1 | Status: SHIPPED | OUTPATIENT
Start: 2021-03-04 | End: 2021-09-20

## 2021-03-04 RX ORDER — SIMVASTATIN 20 MG
20 TABLET ORAL NIGHTLY
Qty: 90 TABLET | Refills: 1 | Status: SHIPPED | OUTPATIENT
Start: 2021-03-04 | End: 2021-07-20

## 2021-03-04 RX ORDER — TORSEMIDE 10 MG/1
20 TABLET ORAL DAILY
Qty: 90 TABLET | Refills: 1 | Status: SHIPPED | OUTPATIENT
Start: 2021-03-04 | End: 2021-04-16 | Stop reason: SDUPTHER

## 2021-03-04 NOTE — TELEPHONE ENCOUNTER
Last OV 11/10/2020    Return in about 6 months (around 5/10/2021). Next OV Visit date not found    Requested Prescriptions     Pending Prescriptions Disp Refills    ELIQUIS 5 MG TABS tablet [Pharmacy Med Name: ELIQUIS  5MG  TAB] 180 tablet 3     Sig: TAKE 1 TABLET BY MOUTH  TWICE DAILY    torsemide (DEMADEX) 10 MG tablet       Sig: Take 2 tablets by mouth daily    spironolactone (ALDACTONE) 25 MG tablet 90 tablet 1     Sig: Take 1 tablet by mouth daily    simvastatin (ZOCOR) 20 MG tablet 90 tablet 1     Sig: Take 1 tablet by mouth nightly    levothyroxine (SYNTHROID) 50 MCG tablet 90 tablet 1     Sig: Take 1 tablet by mouth Daily    dilTIAZem (CARDIZEM CD) 120 MG extended release capsule 90 capsule 1     Sig: Take 1 capsule by mouth daily Do not crush or break.

## 2021-04-01 ENCOUNTER — TELEPHONE (OUTPATIENT)
Dept: FAMILY MEDICINE CLINIC | Age: 66
End: 2021-04-01

## 2021-04-16 RX ORDER — TORSEMIDE 10 MG/1
20 TABLET ORAL DAILY
Qty: 90 TABLET | Refills: 1 | Status: SHIPPED | OUTPATIENT
Start: 2021-04-16 | End: 2021-07-20

## 2021-07-19 NOTE — TELEPHONE ENCOUNTER
Last OV 11/10/2020   Next OV Visit date not found    Requested Prescriptions     Pending Prescriptions Disp Refills    CARTIA  MG extended release capsule [Pharmacy Med Name: CARTIA  120MG  CAP  24 HR XT] 90 capsule 3     Sig: TAKE 1 CAPSULE BY MOUTH  DAILY DO NOT CRUSH OR BREAK    levothyroxine (SYNTHROID) 50 MCG tablet [Pharmacy Med Name: LEVOTHYROXINE  50MCG  TAB] 90 tablet 3     Sig: TAKE 1 TABLET BY MOUTH  DAILY    simvastatin (ZOCOR) 20 MG tablet [Pharmacy Med Name: SIMVASTATIN  20MG  TAB] 90 tablet 3     Sig: TAKE 1 TABLET BY MOUTH AT  NIGHT    torsemide (DEMADEX) 10 MG tablet [Pharmacy Med Name: TORSEMIDE  10MG  TAB] 180 tablet 3     Sig: TAKE 2 TABLETS BY MOUTH  DAILY

## 2021-07-20 RX ORDER — SIMVASTATIN 20 MG
TABLET ORAL
Qty: 90 TABLET | Refills: 3 | Status: SHIPPED | OUTPATIENT
Start: 2021-07-20 | End: 2021-07-21

## 2021-07-20 RX ORDER — TORSEMIDE 10 MG/1
20 TABLET ORAL DAILY
Qty: 180 TABLET | Refills: 3 | Status: SHIPPED | OUTPATIENT
Start: 2021-07-20 | End: 2022-08-08

## 2021-07-20 RX ORDER — DILTIAZEM HYDROCHLORIDE 120 MG/1
CAPSULE, EXTENDED RELEASE ORAL
Qty: 90 CAPSULE | Refills: 3 | Status: SHIPPED | OUTPATIENT
Start: 2021-07-20 | End: 2022-08-08

## 2021-07-20 RX ORDER — LEVOTHYROXINE SODIUM 0.05 MG/1
50 TABLET ORAL DAILY
Qty: 90 TABLET | Refills: 3 | Status: SHIPPED | OUTPATIENT
Start: 2021-07-20 | End: 2022-08-08

## 2021-07-21 RX ORDER — SIMVASTATIN 20 MG
TABLET ORAL
Qty: 90 TABLET | Refills: 3 | Status: SHIPPED | OUTPATIENT
Start: 2021-07-21 | End: 2022-08-08

## 2021-08-11 ENCOUNTER — TELEPHONE (OUTPATIENT)
Dept: OTHER | Facility: CLINIC | Age: 66
End: 2021-08-11

## 2021-08-11 NOTE — TELEPHONE ENCOUNTER
Call made to patient to offer [address] program to patient, no answer. Message left on answering machine for patient to call back.

## 2021-08-12 ENCOUNTER — TELEPHONE (OUTPATIENT)
Dept: OTHER | Facility: CLINIC | Age: 66
End: 2021-08-12

## 2021-08-20 ENCOUNTER — CARE COORDINATION (OUTPATIENT)
Dept: CARE COORDINATION | Age: 66
End: 2021-08-20

## 2021-08-20 NOTE — CARE COORDINATION
Phone call placed to patient for the purpose of offering CM and patient was not available. LM and provided contact information. Plan  Make second phone call    Jovan Garcia.  Kaushal Mcduffie RN, BSN, [address] [place] [address] Primary Care  [phone]

## 2021-08-23 ENCOUNTER — CARE COORDINATION (OUTPATIENT)
Dept: CARE COORDINATION | Age: 66
End: 2021-08-23

## 2021-08-23 NOTE — CARE COORDINATION
Placed second phone call to patient for the purpose of offering CM and she was not available. Plan  Enroll into CM  Make third and final phone call    Hannah Garay.  Anne Reyes RN, BSN, [address] [place] [address]  [phone]

## 2021-08-24 ENCOUNTER — CARE COORDINATION (OUTPATIENT)
Dept: CARE COORDINATION | Age: 66
End: 2021-08-24

## 2021-08-24 NOTE — CARE COORDINATION
Placed third and final phone call to patient for the purpose of offering CM. Phone just rang 3 times and then it hung up today. Patient has not returned phone call with message left. Unable to reach patient to offer CM. Plan  No further outreach is necessary    Saida Aleman.  Cherylene Moll, RN, BSN, [address] [place] [address] Primary Care  [phone]

## 2021-09-20 RX ORDER — SPIRONOLACTONE 25 MG/1
25 TABLET ORAL DAILY
Qty: 90 TABLET | Refills: 3 | Status: SHIPPED | OUTPATIENT
Start: 2021-09-20 | End: 2022-08-08

## 2021-09-20 NOTE — TELEPHONE ENCOUNTER
Last OV 11/10/2020   Next OV Visit date not found    Requested Prescriptions     Pending Prescriptions Disp Refills    spironolactone (ALDACTONE) 25 MG tablet [Pharmacy Med Name: SPIRONOLACTONE 25MG TABLET] 90 tablet 3     Sig: TAKE 1 TABLET BY MOUTH  DAILY

## 2021-10-13 ENCOUNTER — TELEPHONE (OUTPATIENT)
Dept: PULMONOLOGY | Age: 66
End: 2021-10-13

## 2021-10-13 NOTE — TELEPHONE ENCOUNTER
Patient called the office to state that she tested positive for covid on Saturday 10/09/2021. Patient stated that other than a headache she felt fine. She will call the office back if she has any additional symptoms of concern related to her breathing.

## 2021-11-17 ENCOUNTER — OFFICE VISIT (OUTPATIENT)
Dept: FAMILY MEDICINE CLINIC | Age: 66
End: 2021-11-17
Payer: MEDICARE

## 2021-11-17 VITALS
HEIGHT: 62 IN | OXYGEN SATURATION: 96 % | DIASTOLIC BLOOD PRESSURE: 72 MMHG | SYSTOLIC BLOOD PRESSURE: 138 MMHG | HEART RATE: 68 BPM | BODY MASS INDEX: 53.92 KG/M2 | WEIGHT: 293 LBS

## 2021-11-17 DIAGNOSIS — I10 BENIGN HYPERTENSION: ICD-10-CM

## 2021-11-17 DIAGNOSIS — I48.0 PAROXYSMAL ATRIAL FIBRILLATION (HCC): ICD-10-CM

## 2021-11-17 DIAGNOSIS — E21.3 HYPERPARATHYROIDISM (HCC): ICD-10-CM

## 2021-11-17 DIAGNOSIS — E03.9 HYPOTHYROIDISM, UNSPECIFIED TYPE: ICD-10-CM

## 2021-11-17 DIAGNOSIS — J44.9 CHRONIC OBSTRUCTIVE PULMONARY DISEASE, UNSPECIFIED COPD TYPE (HCC): ICD-10-CM

## 2021-11-17 DIAGNOSIS — R73.03 PRE-DIABETES: ICD-10-CM

## 2021-11-17 DIAGNOSIS — Z12.11 COLON CANCER SCREENING: ICD-10-CM

## 2021-11-17 DIAGNOSIS — Z00.00 ROUTINE GENERAL MEDICAL EXAMINATION AT A HEALTH CARE FACILITY: Primary | ICD-10-CM

## 2021-11-17 DIAGNOSIS — Z23 FLU VACCINE NEED: ICD-10-CM

## 2021-11-17 LAB
A/G RATIO: 1.5 (ref 1.1–2.2)
ALBUMIN SERPL-MCNC: 4.5 G/DL (ref 3.4–5)
ALP BLD-CCNC: 45 U/L (ref 40–129)
ALT SERPL-CCNC: 13 U/L (ref 10–40)
ANION GAP SERPL CALCULATED.3IONS-SCNC: 17 MMOL/L (ref 3–16)
AST SERPL-CCNC: 15 U/L (ref 15–37)
BILIRUB SERPL-MCNC: 0.4 MG/DL (ref 0–1)
BUN BLDV-MCNC: 24 MG/DL (ref 7–20)
CALCIUM SERPL-MCNC: 9.8 MG/DL (ref 8.3–10.6)
CHLORIDE BLD-SCNC: 103 MMOL/L (ref 99–110)
CHOLESTEROL, TOTAL: 156 MG/DL (ref 0–199)
CO2: 24 MMOL/L (ref 21–32)
CREAT SERPL-MCNC: 0.9 MG/DL (ref 0.6–1.2)
GFR AFRICAN AMERICAN: >60
GFR NON-AFRICAN AMERICAN: >60
GLUCOSE BLD-MCNC: 102 MG/DL (ref 70–99)
HDLC SERPL-MCNC: 47 MG/DL (ref 40–60)
LDL CHOLESTEROL CALCULATED: 76 MG/DL
PARATHYROID HORMONE INTACT: 81.4 PG/ML (ref 14–72)
POTASSIUM SERPL-SCNC: 4.8 MMOL/L (ref 3.5–5.1)
SODIUM BLD-SCNC: 144 MMOL/L (ref 136–145)
TOTAL PROTEIN: 7.6 G/DL (ref 6.4–8.2)
TRIGL SERPL-MCNC: 163 MG/DL (ref 0–150)
TSH SERPL DL<=0.05 MIU/L-ACNC: 1.55 UIU/ML (ref 0.27–4.2)
VLDLC SERPL CALC-MCNC: 33 MG/DL

## 2021-11-17 PROCEDURE — 90694 VACC AIIV4 NO PRSRV 0.5ML IM: CPT | Performed by: FAMILY MEDICINE

## 2021-11-17 PROCEDURE — G0008 ADMIN INFLUENZA VIRUS VAC: HCPCS | Performed by: FAMILY MEDICINE

## 2021-11-17 PROCEDURE — 4040F PNEUMOC VAC/ADMIN/RCVD: CPT | Performed by: FAMILY MEDICINE

## 2021-11-17 PROCEDURE — G0438 PPPS, INITIAL VISIT: HCPCS | Performed by: FAMILY MEDICINE

## 2021-11-17 PROCEDURE — 1123F ACP DISCUSS/DSCN MKR DOCD: CPT | Performed by: FAMILY MEDICINE

## 2021-11-17 PROCEDURE — 3017F COLORECTAL CA SCREEN DOC REV: CPT | Performed by: FAMILY MEDICINE

## 2021-11-17 PROCEDURE — G8484 FLU IMMUNIZE NO ADMIN: HCPCS | Performed by: FAMILY MEDICINE

## 2021-11-17 RX ORDER — THIAMINE HCL 100 MG
30 TABLET ORAL DAILY
COMMUNITY

## 2021-11-17 SDOH — ECONOMIC STABILITY: FOOD INSECURITY: WITHIN THE PAST 12 MONTHS, YOU WORRIED THAT YOUR FOOD WOULD RUN OUT BEFORE YOU GOT MONEY TO BUY MORE.: NEVER TRUE

## 2021-11-17 SDOH — ECONOMIC STABILITY: FOOD INSECURITY: WITHIN THE PAST 12 MONTHS, THE FOOD YOU BOUGHT JUST DIDN'T LAST AND YOU DIDN'T HAVE MONEY TO GET MORE.: NEVER TRUE

## 2021-11-17 ASSESSMENT — PATIENT HEALTH QUESTIONNAIRE - PHQ9
SUM OF ALL RESPONSES TO PHQ QUESTIONS 1-9: 0
SUM OF ALL RESPONSES TO PHQ QUESTIONS 1-9: 0
1. LITTLE INTEREST OR PLEASURE IN DOING THINGS: 0
2. FEELING DOWN, DEPRESSED OR HOPELESS: 0
SUM OF ALL RESPONSES TO PHQ QUESTIONS 1-9: 0
SUM OF ALL RESPONSES TO PHQ9 QUESTIONS 1 & 2: 0

## 2021-11-17 ASSESSMENT — LIFESTYLE VARIABLES: HOW OFTEN DO YOU HAVE A DRINK CONTAINING ALCOHOL: 0

## 2021-11-17 ASSESSMENT — SOCIAL DETERMINANTS OF HEALTH (SDOH): HOW HARD IS IT FOR YOU TO PAY FOR THE VERY BASICS LIKE FOOD, HOUSING, MEDICAL CARE, AND HEATING?: NOT HARD AT ALL

## 2021-11-17 NOTE — PATIENT INSTRUCTIONS
Personalized Preventive Plan for Allsyon Stover - 11/17/2021  Medicare offers a range of preventive health benefits. Some of the tests and screenings are paid in full while other may be subject to a deductible, co-insurance, and/or copay. Some of these benefits include a comprehensive review of your medical history including lifestyle, illnesses that may run in your family, and various assessments and screenings as appropriate. After reviewing your medical record and screening and assessments performed today your provider may have ordered immunizations, labs, imaging, and/or referrals for you. A list of these orders (if applicable) as well as your Preventive Care list are included within your After Visit Summary for your review. Other Preventive Recommendations:    · A preventive eye exam performed by an eye specialist is recommended every 1-2 years to screen for glaucoma; cataracts, macular degeneration, and other eye disorders. · A preventive dental visit is recommended every 6 months. · Try to get at least 150 minutes of exercise per week or 10,000 steps per day on a pedometer . · Order or download the FREE \"Exercise & Physical Activity: Your Everyday Guide\" from [employer]. Call 2-363.610.8641 or search [employer] online. · You need 0206-1446 mg of calcium and 6853-1933 IU of vitamin D per day. It is possible to meet your calcium requirement with diet alone, but a vitamin D supplement is usually necessary to meet this goal.  · When exposed to the sun, use a sunscreen that protects against both UVA and UVB radiation with an SPF of 30 or greater. Reapply every 2 to 3 hours or after sweating, drying off with a towel, or swimming. · Always wear a seat belt when traveling in a car. Always wear a helmet when riding a bicycle or motorcycle.

## 2021-11-17 NOTE — PROGRESS NOTES
fluticasone (FLONASE) 50 MCG/ACT nasal spray 2 sprays by Nasal route daily  Patient not taking: Reported on 11/10/2020  Anju Workman MD   sodium chloride (ALTAMIST SPRAY) 0.65 % nasal spray 2 sprays by Nasal route as needed for Congestion (Q 2-3 HOURS prn)  Patient not taking: Reported on 11/10/2020  Anju Workman MD   prazosin (MINIPRESS) 1 MG capsule TAKE 1 CAPSULE BY MOUTH EVERY NIGHT  Patient not taking: Reported on 11/10/2020  Cee Jenkins MD   EFFER-K 20 MEQ TBEF effervescent tablet DISSOLVE AND DRINK 1 TABLET BY MOUTH TWICE DAILY WITH MEALS  Patient not taking: Reported on 11/17/2021  Cee Jenkins MD   pantoprazole (PROTONIX) 40 MG tablet TAKE 1 TABLET BY MOUTH DAILY  Patient not taking: Reported on 11/10/2020  Cee Jenkins MD   metoprolol tartrate (LOPRESSOR) 50 MG tablet TAKE 1 TABLET BY MOUTH TWICE DAILY  Patient not taking: Reported on 11/10/2020  Cee Jenkins MD   potassium chloride (KLOR-CON M) 10 MEQ extended release tablet Take 1 tablet by mouth 2 times daily  Patient not taking: Reported on 11/17/2021  Cee Jenkins MD   nystatin (MYCOSTATIN) 915622 UNIT/GM cream Apply topically 2 times daily. Patient not taking: Reported on 11/17/2021  Cee Jenkins MD   metaxalone [facility]) 800 MG tablet Take 800 mg by mouth 3 times daily  Patient not taking: Reported on 11/17/2021  Historical Provider, MD   polyethylene glycol (GLYCOLAX) packet Take 17 g by mouth daily as needed  Historical Provider, MD   traMADol (ULTRAM) 50 MG tablet Take 50 mg by mouth every 6 hours as needed for Pain. Patient not taking: Reported on 11/17/2021  Historical Provider, MD   metFORMIN (GLUCOPHAGE) 500 MG tablet TAKE 1 TABLET BY MOUTH TWICE DAILY WITH FOOD  Patient not taking: Reported on 11/10/2020  Cee Jenkins MD   Multiple Vitamins-Minerals (THERAPEUTIC MULTIVITAMIN-MINERALS) tablet Take 1 tablet by mouth daily.   Patient not taking: Reported on 11/17/2021  Historical Provider, MD       Past Medical History:   Diagnosis Date    Hyperlipidemia     Hypertension     Type II or unspecified type diabetes mellitus without mention of complication, not stated as uncontrolled        Past Surgical History:   Procedure Laterality Date    HYSTERECTOMY      TONSILLECTOMY AND ADENOIDECTOMY  1960       Family History   Problem Relation Age of Onset    High Cholesterol Mother     Cancer Mother         leukemia     Cancer Father         brain       CareTeam (Including outside providers/suppliers regularly involved in providing care):   Patient Care Team:  Franny Blake MD as PCP - General (Family Medicine)  Franny Blake MD as PCP - [facility] Empaneled Provider  Angélica Mojica DO (Family Medicine)  Angélica Mojica DO (Family Medicine)  Agnélica Mojica DO (Family Medicine)    Wt Readings from Last 3 Encounters:   11/17/21 (!) 310 lb (140.6 kg)   11/10/20 287 lb (130.2 kg)   09/24/19 (!) 328 lb (148.8 kg)     Vitals:    11/17/21 1322   BP: 138/72   Site: Left Lower Arm   Position: Sitting   Cuff Size: Medium Adult   Pulse: 68   SpO2: 96%   Weight: (!) 310 lb (140.6 kg)   Height: 5' 2\" (1.575 m)     Body mass index is 56.7 kg/m². Based upon direct observation of the patient, evaluation of cognition reveals recent and remote memory intact. Patient's complete Health Risk Assessment and screening values have been reviewed and are found in Flowsheets. The following problems were reviewed today and where indicated follow up appointments were made and/or referrals ordered. Positive Risk Factor Screenings with Interventions:     Fall Risk:  2 or more falls in past year?: (!) yes  Fall Risk Interventions:    · Patient declines any further evaluation/treatment for this issue        General Health and ACP:  General  In general, how would you say your health is?: Very Good  In the past 7 days, have you experienced any of the following?  New or Increased Pain, New or Increased Fatigue, Loneliness, Social Isolation, Stress or Anger?: (!) New or Increased Pain  Do you get the social and emotional support that you need?: Yes  Do you have a Living Will?: Yes  Advance Directives     Power of  Living Will ACP-Advance Directive ACP-Power of     Not on File Filed on 07/16/19 [address] [facility] Risk Interventions:  · Pain issues: knee pain-comes and goes    Health Habits/Nutrition:  Health Habits/Nutrition  Do you exercise for at least 20 minutes 2-3 times per week?: Yes  Have you lost any weight without trying in the past 3 months?: No  Do you eat only one meal per day?: No  Have you seen the dentist within the past year?: Appointment is scheduled  Body mass index: (!) 56.69  Health Habits/Nutrition Interventions:  · declined bariatric surgery referral       Personalized Preventive Plan   Current Health Maintenance Status  Immunization History   Administered Date(s) Administered    COVID-19, Santamaria Peter, PF, 30mcg/0.3mL 03/23/2021, 04/13/2021    Influenza Virus Vaccine 10/24/2014, 11/02/2015    Influenza, Quadv, IM, (6 mo and older Fluzone, Flulaval, Fluarix and 3 yrs and older Afluria) 10/10/2016    Influenza, Quadv, IM, PF (6 mo and older Fluzone, Flulaval, Fluarix, and 3 yrs and older Afluria) 10/09/2017    Influenza, Quadv, adjuvanted, 65 yrs +, IM, PF (Fluad) 11/04/2020    Influenza, Triv, inactivated, subunit, adjuvanted, IM (Fluad 65 yrs and older) 12/11/2019    Pneumococcal Conjugate 13-valent (Ravi Skinnerau) 11/04/2020        Health Maintenance   Topic Date Due    DTaP/Tdap/Td vaccine (1 - Tdap) Never done    Shingles Vaccine (1 of 2) Never done   [employer] Visit (AWV)  Never done    Colon Cancer Screen FIT/FOBT  01/15/2021    Flu vaccine (1) 09/01/2021    COVID-19 Vaccine (3 - Booster for Pfizer series) 10/13/2021    Pneumococcal 65+ years Vaccine (2 of 2 - PPSV23) 11/04/2021    A1C test (Diabetic or Prediabetic)  11/10/2021    Potassium monitoring 11/10/2021    Creatinine monitoring  11/10/2021    Lipid screen  11/10/2021    Breast cancer screen  11/04/2022    DEXA (modify frequency per FRAX score)  Completed    Hepatitis C screen  Completed    Hepatitis A vaccine  Aged Out    Hepatitis B vaccine  Aged Out    Hib vaccine  Aged Out    Meningococcal (ACWY) vaccine  Aged Out     Recommendations for [employer] Due: see orders and patient instructions/AVS.  . Recommended screening schedule for the next 5-10 years is provided to the patient in written form: see Patient Instructions/AVS.    Amirah Greene was seen today for medicare awv and flu vaccine. Diagnoses and all orders for this visit:    Routine general medical examination at a health care facility  -     INFLUENZA, QUADV, ADJUVANTED, 72 YRS =, IM, PF, PREFILL SYR, 0.5ML (FLUAD)    Body mass index (BMI) 50.0-59.9, adult  Not well controlled   Declined bariatric referral.  Pre-diabetes  -     Hemoglobin A1C; Future    Hypothyroidism, unspecified type  -     TSH without Reflex; Future  Recheck to see if stable  Benign hypertension  -     Lipid Panel; Future  -     Comprehensive Metabolic Panel; Future  The current medical regimen is effective;  continue present plan and medications. Chronic obstructive pulmonary disease, unspecified COPD type ([address]  stable  Paroxysmal atrial fibrillation (HCC)    Hyperparathyroidism ([facility]  -     PTH, INTACT; Future    Colon cancer screening  -     Cologuard; Future    Flu vaccine need  -     INFLUENZA, QUADV, ADJUVANTED, 65 YRS =, IM, PF, PREFILL SYR, 0.5ML (FLUAD)         had covid. Cough which came and went. Wasn't very severe. A little diarrhea. Improved. Compliant with bipap.

## 2021-11-18 LAB
ESTIMATED AVERAGE GLUCOSE: 119.8 MG/DL
HBA1C MFR BLD: 5.8 %

## 2021-11-22 ENCOUNTER — OFFICE VISIT (OUTPATIENT)
Dept: PULMONOLOGY | Age: 66
End: 2021-11-22
Payer: MEDICARE

## 2021-11-22 VITALS
WEIGHT: 285 LBS | SYSTOLIC BLOOD PRESSURE: 118 MMHG | OXYGEN SATURATION: 99 % | TEMPERATURE: 97.8 F | RESPIRATION RATE: 18 BRPM | DIASTOLIC BLOOD PRESSURE: 48 MMHG | HEIGHT: 62 IN | BODY MASS INDEX: 52.44 KG/M2 | HEART RATE: 61 BPM

## 2021-11-22 DIAGNOSIS — J44.9 CHRONIC OBSTRUCTIVE PULMONARY DISEASE, UNSPECIFIED COPD TYPE (HCC): Primary | ICD-10-CM

## 2021-11-22 DIAGNOSIS — J96.12 CHRONIC RESPIRATORY FAILURE WITH HYPERCAPNIA (HCC): ICD-10-CM

## 2021-11-22 DIAGNOSIS — Z99.89 DEPENDENCE ON OTHER ENABLING MACHINES AND DEVICES: ICD-10-CM

## 2021-11-22 PROCEDURE — 1123F ACP DISCUSS/DSCN MKR DOCD: CPT | Performed by: INTERNAL MEDICINE

## 2021-11-22 PROCEDURE — G8484 FLU IMMUNIZE NO ADMIN: HCPCS | Performed by: INTERNAL MEDICINE

## 2021-11-22 PROCEDURE — G8417 CALC BMI ABV UP PARAM F/U: HCPCS | Performed by: INTERNAL MEDICINE

## 2021-11-22 PROCEDURE — G8428 CUR MEDS NOT DOCUMENT: HCPCS | Performed by: INTERNAL MEDICINE

## 2021-11-22 PROCEDURE — 3023F SPIROM DOC REV: CPT | Performed by: INTERNAL MEDICINE

## 2021-11-22 PROCEDURE — 3017F COLORECTAL CA SCREEN DOC REV: CPT | Performed by: INTERNAL MEDICINE

## 2021-11-22 PROCEDURE — 1090F PRES/ABSN URINE INCON ASSESS: CPT | Performed by: INTERNAL MEDICINE

## 2021-11-22 PROCEDURE — 1036F TOBACCO NON-USER: CPT | Performed by: INTERNAL MEDICINE

## 2021-11-22 PROCEDURE — G8399 PT W/DXA RESULTS DOCUMENT: HCPCS | Performed by: INTERNAL MEDICINE

## 2021-11-22 PROCEDURE — 99214 OFFICE O/P EST MOD 30 MIN: CPT | Performed by: INTERNAL MEDICINE

## 2021-11-22 PROCEDURE — 4040F PNEUMOC VAC/ADMIN/RCVD: CPT | Performed by: INTERNAL MEDICINE

## 2021-11-22 PROCEDURE — G8926 SPIRO NO PERF OR DOC: HCPCS | Performed by: INTERNAL MEDICINE

## 2021-11-22 RX ORDER — ZINC GLUCONATE 50 MG
50 TABLET ORAL DAILY
COMMUNITY

## 2021-11-22 ASSESSMENT — ENCOUNTER SYMPTOMS
SHORTNESS OF BREATH: 1
TROUBLE SWALLOWING: 0
ALLERGIC/IMMUNOLOGIC NEGATIVE: 1
COUGH: 1
GASTROINTESTINAL NEGATIVE: 1
FREQUENT THROAT CLEARING: 0
SORE THROAT: 0
HEMOPTYSIS: 0
RHINORRHEA: 0
SPUTUM PRODUCTION: 0
HOARSE VOICE: 0
HEARTBURN: 0
WHEEZING: 0
DIFFICULTY BREATHING: 0
CHEST TIGHTNESS: 0
EYES NEGATIVE: 1

## 2021-11-22 ASSESSMENT — COPD QUESTIONNAIRES: COPD: 1

## 2021-11-22 NOTE — LETTER
11/22/21        [facility]      I have seen this patient in the office today and wanted to communicate my findings and recommendations. Patient Instructions      ASSESSMENT/PLAN:  1. Chronic obstructive pulmonary disease, unspecified COPD type ([address]  2. Chronic respiratory failure with hypercapnia (HCC)  3.  Dependence on other enabling machines and devices    Pt has COPD and elevated PCO2  No S&S of erin  Will need to start with bipap  Would suggest getting the bipap prior to d/c  On oxygen and will need this also  Would refer to dme to get both setup prior to d/c  Will need to wean oxygen  Will need to get bipap  Would suggest autobipap with ipap max of 25 and epap min of 8 with nasal mask/pillows  Was set up after hospitalization in 7/2019  Doing well    No need for inhalers    This was ordered:  autobipap was set ipap 15, epap min 4, ti max of 4.0 and min of 1.0, trigger and cycle low, ps of 1  Ok to use wedge for the bed    However it was set at :  bipap is set at 13/6  Using 100% of time  Using 9 h/night  ti max 2.0 and min 0.3  Trigger and cycle medium  No sleep apnea, ahi 0.5  tv is 601  Leak at 13 lpm  mv 13.9  Nasal pillows    I have access via [employer]  dme is [facility]    Last ABG done 11/18/19  Ref Range & Units 1mo ago   PH 7.35 - 7.45 7.486High   PCO2 34 - 46 mm HG 29.6Low   PO2 90 - 110 mm HG 76Low   BICARBONATE 21 - 29 mmol/L 21.8   BASE DEFICIT 0 - 2 mmol/L 1   O2 SAT 92 - 96 % 96   This ABG done in November was after 3 sticks patient was hyperventilating, that is the reason why this CO2 is so low    HCO3 was 24 on 1/15/20    HCO3 was 24 on 11/17/21    I have access via [employer]  dme is Toby    Weight is coming down was 328 and then at 309 and at 295 at 285 to 280 to 285    RTC in 6 months                        Thank you for allowing me to assist in the care of the Roverto Ruiz MD

## 2021-11-22 NOTE — PATIENT INSTRUCTIONS
ASSESSMENT/PLAN:  1. Chronic obstructive pulmonary disease, unspecified COPD type ([address]  2. Chronic respiratory failure with hypercapnia (HCC)  3. Dependence on other enabling machines and devices    Pt has COPD and elevated PCO2  No S&S of erin  Will need to start with bipap  Would suggest getting the bipap prior to d/c  On oxygen and will need this also  Would refer to dme to get both setup prior to d/c  Will need to wean oxygen  Will need to get bipap  Would suggest autobipap with ipap max of 25 and epap min of 8 with nasal mask/pillows  Was set up after hospitalization in 7/2019  Doing well    No need for inhalers    This was ordered:  autobipap was set ipap 15, epap min 4, ti max of 4.0 and min of 1.0, trigger and cycle low, ps of 1  Ok to use wedge for the bed    However it was set at :  bipap is set at 13/6  Using 100% of time  Using 9 h/night  ti max 2.0 and min 0.3  Trigger and cycle medium  No sleep apnea, ahi 0.5  tv is 601  Leak at 13 lpm  mv 13.9  Nasal pillows    I have access via [employer]  dme is [facility]    Last ABG done 11/18/19  Ref Range & Units 1mo ago   PH 7.35 - 7.45 7.486High   PCO2 34 - 46 mm HG 29.6Low   PO2 90 - 110 mm HG 76Low   BICARBONATE 21 - 29 mmol/L 21.8   BASE DEFICIT 0 - 2 mmol/L 1   O2 SAT 92 - 96 % 96   This ABG done in November was after 3 sticks patient was hyperventilating, that is the reason why this CO2 is so low    HCO3 was 24 on 1/15/20    HCO3 was 24 on 11/17/21    I have access via [employer]  dme is [employer]    Weight is coming down was 328 and then at 309 and at 295 at 285 to 280 to 285    RTC in 6 months       Remember to bring a list of pulmonary medications and any CPAP or BiPAP machines to your next appointment with the office. Please keep all of your future appointments scheduled by [address] [facility] office.  Out of respect for other patients and providers, you may be asked to reschedule your appointment if you arrive later than your scheduled appointment time. Appointments cancelled less than 24hrs in advance will be considered a no show. Patients with three missed appointments within 1 year or four missed appointments within 2 years can be dismissed from the practice. Please be aware that our physicians are required to work in the Intensive Care Unit at [facility].  Your appointment may need to be rescheduled if they are designated to work during your appointment time. You may receive a survey regarding the care you received during your visit. Your input is valuable to us. We encourage you to complete and return your survey. We hope you will choose us in the future for your healthcare needs. Pt instructed of all future appointment dates & times, including radiology, labs, procedures & referrals. If procedures were scheduled preparation instructions provided. Instructions on future appointments with [facility] were given.

## 2021-11-22 NOTE — PROGRESS NOTES
MA Communication:   The following orders are received by verbal communication from Jack Almeida MD    Orders include:  Order faxed to [facility]       6 mo fu scheduled 5/23/22

## 2021-11-22 NOTE — PROGRESS NOTES
Chinmay Foley (:  1955) is a 78 y.o. female,Established patient, here for evaluation of the following chief complaint(s):  Follow-up (Pulm/sleep f/u  former [facility] pt)         ASSESSMENT/PLAN:  1. Chronic obstructive pulmonary disease, unspecified COPD type ([address]  2. Chronic respiratory failure with hypercapnia (HCC)  3. Dependence on other enabling machines and devices    Pt has COPD and elevated PCO2  No S&S of erin  Will need to start with bipap  Would suggest getting the bipap prior to d/c  On oxygen and will need this also  Would refer to dme to get both setup prior to d/c  Will need to wean oxygen  Will need to get bipap  Would suggest autobipap with ipap max of 25 and epap min of 8 with nasal mask/pillows  Was set up after hospitalization in 2019  Doing well    No need for inhalers    This was ordered:  autobipap was set ipap 15, epap min 4, ti max of 4.0 and min of 1.0, trigger and cycle low, ps of 1  Ok to use wedge for the bed    However it was set at :  bipap is set at 13/6  Using 100% of time  Using 9 h/night  ti max 2.0 and min 0.3  Trigger and cycle medium  No sleep apnea, ahi 0.5  tv is 601  Leak at 13 lpm  mv 13.9  Nasal pillows    I have access via [employer]  dme is [employer]    Last ABG done 19  Ref Range & Units 1mo ago   PH 7.35 - 7.45 7.486High   PCO2 34 - 46 mm HG 29.6Low   PO2 90 - 110 mm HG 76Low   BICARBONATE 21 - 29 mmol/L 21.8   BASE DEFICIT 0 - 2 mmol/L 1   O2 SAT 92 - 96 % 96   This ABG done in November was after 3 sticks patient was hyperventilating, that is the reason why this CO2 is so low    HCO3 was 24 on 1/15/20    HCO3 was 24 on 21    I have access via [employer]  dme is [employer]    Weight is coming down was 328 and then at 309 and at 295 at 285 to 280 to 285    RTC in 6 months                     No follow-ups on file.              [address] [facility]  [address]  [place] [address]  Dept: [phone]  Loc: [phone]     Diagnosis:  [] KENNETH (ICD-10: G47.33)  o CSA (ICD-10: G47.31)  [] Complex Sleep Apnea (ICD-10: G47.37)  []  (ICD-10: G47.37)  [] Hypoxemia (ICD-10: R09.02)  [x] COPD (J44.90)  [] Chronic Respiratory Failure with hypoxemia (J96.11)  [x] Chronicr Respiratory Failure with hypercapnia (J96.12)  [] Restrictive Lung Disease (J98.4)      [] New Rx (Device Preference: _________________________)     [] Change Order       [] Replace ___________  [] Clinical assessments and may include IN-Check device, spirometry and ETCO2 PRN    [] Discontinue Order -  [] CPAP    [] BPAP    [] PAP    [] Oxygen   /   AMA?  [] Yes   [] No              Therapy    AHI: ________ ADEBAYO: ________  LOW SpO2: ________%      DME: [facility]    DEVICE / SETTINGS RAMP / COMFORT INTERFACE   []  CPAP () Pressure    Ramp: _________ min's  [] Ramp to patient preference General PAP Supply Kit  Medicaid does not cover heated tubing  ([employer])  PAP Tubing:  [x] Heated ()- 1/3 mos                         [x] Regular ()  1/3 mos  [x] Disposable Water Chamber () - 1/6 mos  [x] Disposable Filter () - 2/mo  [x] Non-Disposable Filter () - 1/6 mos   []  BiLevel PAP ()           IPAP        []  BiLevel PAP with   ()       Backup Rate ()      EPAP Rate  [] Adjust FLEX to patient comfort       SUPPLEMENTAL OXYGEN  [] OXYGEN:      Liter/min: _________  [] Continuous        [] Nocturnal  [] Bleed into PAP Device      []  [address] Kit    [x] Mask interface () - 1/3 mos  [x] Nasal Cushion ()  2/mo  [x] Nasal Pillows ()  -2/mo  [x] Headgear ()  1/6 mos   []  AutoBiLevel () Pressure  ()      Support           []  ResMed® IVAPS EPAP  [] Overnight Oximetry on Room Air  [] Overnight Oximetry on PAP Therapy    Target Pt Rate  Min PS      Target Va  Patient Ht  Ti Max                Ti Min        Rise time patient requires a Respiratory Assist Device (RAD) and the following apply:   o CPAP was tried and failed to meet therapeutic goals. [] CPAP was tried, but failed to meet therapeutic goals   o The prescribed mask interface has been properly fit, is the most comfortable to the patient and will be used with the BPAP device. [] The prescribed mask interface has been properly fit, is the most comfortable to the patient and will be used with the RAD.   o Current CPAP setting prevents patient from tolerating the therapy and lower CPAP settings fail to adequately control the symptoms of KENNETH, improve sleep quality, or reduce AHI to acceptable levels. [] Current CPAP setting prevent patient from tolerating the therapy and lower PAP settings fail to  adequately control KENNETH symptoms, improve sleep quality, or reduce AHI to acceptable levels. [] There is significant improvement of the respiratory events on the RAD                                                                                                                                                                                                                                  Jose Pugh MD               NPI- [id number]     Gilles Kirby [phone]                    11/22/21       ____________________________                        _______________________           Physician Signature                                                         Date                                                     Subjective   SUBJECTIVE/OBJECTIVE:  Transfer of care from [facility] to [facility]  Initially seen at [facility] on 11/22/2021    Last seen at [facility] on 5/26/2021          Hospitalized in 7/2019  Seen there  D/c summary  The patient is a(n) 71year old female who was admitted for acute respiratory failure with hypoxemia. Patient presented to the emergency room with increasing shortness of breath.  At the time of presentation the patient was found to have atrial fibrillation with RVR. Patient showed evidence of fluid overload with peripheral edema. She was admitted to telemetry and seen by cardiology. She was begun on IV Lasix. An echocardiogram was obtained and she was found to have an ejection fraction of 55-60%. She was also noted to have right ventricular hypertension and mild to moderate left ventricular hypertrophy. Patient's troponins were elevated at the time of admission. She underwent a stress test that showed dyskinesis involving the septum and apex. She was noted to have decreased perfusion in the cardiac apex. Overnight on day 2 of admission the patient had increasing oxygen requirements. She was placed on nonrebreather mask. She subsequently developed lethargy. Arterial Blood gas revealed hypercapnic respiratory failure. Patient was transferred emergently to the ICU and begun on mechanical ventilation. She was rapidly extubated after 24 hours and begun on BiPAP. Patient was transferred back to [place] [address]. IV Lasix was continued by constant infusion. Patient has had gradual improvement in her respiratory function. On day of discharge she is breathing comfortably on 2 L of O2. She is awake and alert. She has tolerated BiPAP. Patient was treated for urinary tract infection with IV ceftriaxone ×3 days for Escherichia coli UTI. Repeat culture was positive for E. Coli. She will be discharged on levofloxacin 350 mg per day for 7 days. Patient's renal function remained stable. On the day prior to discharge the patient's creatinine started to increase. IV Lasix was discontinued and the patient was started on Demadex 50 mg by mouth twice a day. Patient was not started on anticoagulation due to iron deficiency anemia present on admission. Patient is planned for discharge to an extended care facility for subacute rehabilitation. Patient is significantly debilitated from 2 weeks of bed rest. Patient is agreeable to transfer.     My plan  Principal Problem:  Acute respiratory failure with hypoxia (HCC)  Active Problems:  Atypical chest pain  Elevated troponin  Microcytic anemia  Elevated brain natriuretic peptide (BNP) level  Acute combined systolic and diastolic CHF, NYHA class 2 (HCC)  Paroxysmal atrial fibrillation (HCC)  T2DM (type 2 diabetes mellitus) ([facility]  Morbid obesity due to excess calories ([facility]        Discussion /Plan:        1. Pt has COPD and elevated PCO2  2. No S&S of erin  3. Will need to start with bipap  4. Would suggest getting the bipap prior to d/c  5. On oxygen and will need this also  6. Would refer to dme to get both setup prior to d/c  7. Will need to wean oxygen  8. Will need to get bipap  9. Would suggest autobipap with ipap max of 25 and epap min of 8 with nasal mask/pillows  10. May need oxygen       Having some issues with the mask and headache    Using during the day  Right now set at 10/3    Now has dreamwear  Not the full face mask    No bloating  No burping  Some ear popping, in the afternoon and evening hours    Doing better with the bipap    No dry mouth  \  Sleeping well  No chest pain  No blaotign  No burping    No ear popping    Less noise    Some talking    Some snoring  But sleeping    Using nasal dreamwear    Using wheelchair only outside         Was vaccinated with pfizer and caught Covid      COPD  She complains of cough and shortness of breath. There is no chest tightness, difficulty breathing, frequent throat clearing, hemoptysis, hoarse voice, sputum production or wheezing. This is a chronic problem. The current episode started more than 1 year ago. The problem occurs rarely. The cough is non-productive. Pertinent negatives include no appetite change, chest pain, dyspnea on exertion, ear congestion, ear pain, fever, headaches, heartburn, malaise/fatigue, myalgias, nasal congestion, orthopnea, PND, postnasal drip, rhinorrhea, sneezing, sore throat, sweats, trouble swallowing or weight loss. Review of Systems   Constitutional: Negative. of motion. Cervical back: Normal range of motion and neck supple. No rigidity. Skin:     General: Skin is warm and dry. Coloration: Skin is not jaundiced. Neurological:      General: No focal deficit present. Mental Status: She is alert and oriented to person, place, and time. Mental status is at baseline. Cranial Nerves: No cranial nerve deficit. Sensory: No sensory deficit. Motor: No weakness. Gait: Gait normal.   Psychiatric:         Mood and Affect: Mood normal.         Thought Content:  Thought content normal.         Judgment: Judgment normal.                  An electronic signature was used to authenticate this note.    --Ben Snow MD

## 2022-03-15 ENCOUNTER — PATIENT MESSAGE (OUTPATIENT)
Dept: FAMILY MEDICINE CLINIC | Age: 67
End: 2022-03-15

## 2022-03-15 DIAGNOSIS — Z12.11 COLON CANCER SCREENING: Primary | ICD-10-CM

## 2022-03-16 NOTE — TELEPHONE ENCOUNTER
From: Stu Hitchcock  Sent: 3/15/2022 10:38 AM EDT  To: Manju Dang MA  Subject: RE: Colonoscopy Screening      I was supposed to receive the cologuard when I sawDr Wendi Helen but never received it. Could you please send it to me. Thanks.   ----- Message -----  From: Brigitte Maty  Sent: 3/15/22 8:56 AM  To: Andrea Sanchez  Subject: Colonoscopy Screening    Good Morning,  You are receiving this message because you are due for a colon cancer screening. This can be a Cologuard at home test if you qualify, or a colonoscopy. Please contact our office to let us know which test you prefer, and we will place the appropriate order. If you have had a full colonoscopy in the past and anything was found to be irregular, you unfortunately do not qualify for the at home Cologuard test.     Thank you. [facility]    Have you tried the [email or web address] rica? It's your connection to all things health care, all in one place. You can manage appointments, virtual visits, digital paperwork, medication, health records, your bill and much more! Download the [email or web address] in your mobile rica store or visit [email or web address] for more information.

## 2022-08-08 RX ORDER — SIMVASTATIN 20 MG
TABLET ORAL
Qty: 90 TABLET | Refills: 3 | Status: SHIPPED | OUTPATIENT
Start: 2022-08-08

## 2022-08-08 RX ORDER — DILTIAZEM HYDROCHLORIDE 120 MG/1
CAPSULE, COATED, EXTENDED RELEASE ORAL
Qty: 90 CAPSULE | Refills: 3 | Status: SHIPPED | OUTPATIENT
Start: 2022-08-08

## 2022-08-08 RX ORDER — SPIRONOLACTONE 25 MG/1
25 TABLET ORAL DAILY
Qty: 90 TABLET | Refills: 3 | Status: SHIPPED | OUTPATIENT
Start: 2022-08-08

## 2022-08-08 RX ORDER — LEVOTHYROXINE SODIUM 0.05 MG/1
50 TABLET ORAL DAILY
Qty: 90 TABLET | Refills: 3 | Status: SHIPPED | OUTPATIENT
Start: 2022-08-08

## 2022-08-08 RX ORDER — TORSEMIDE 10 MG/1
20 TABLET ORAL DAILY
Qty: 180 TABLET | Refills: 3 | Status: SHIPPED | OUTPATIENT
Start: 2022-08-08

## 2022-08-08 NOTE — TELEPHONE ENCOUNTER
Medication:   Requested Prescriptions     Pending Prescriptions Disp Refills    torsemide (DEMADEX) 10 MG tablet [Pharmacy Med Name: TORSEMIDE  10MG  TAB] 180 tablet 3     Sig: TAKE 2 TABLETS BY MOUTH  DAILY    dilTIAZem (CARDIZEM CD) 120 MG extended release capsule [Pharmacy Med Name: DilTIAZem CD CAP 120MG/24HR] 90 capsule 3     Sig: TAKE 1 CAPSULE BY MOUTH  DAILY DO NOT CRUSH OR BREAK    simvastatin (ZOCOR) 20 MG tablet [Pharmacy Med Name: Simvastatin 20 MG Oral Tablet] 90 tablet 3     Sig: TAKE 1 TABLET BY MOUTH AT  NIGHT    spironolactone (ALDACTONE) 25 MG tablet [Pharmacy Med Name: Spironolactone 25 MG Oral Tablet] 90 tablet 3     Sig: TAKE 1 TABLET BY MOUTH  DAILY    levothyroxine (SYNTHROID) 50 MCG tablet [Pharmacy Med Name: Levothyroxine Sodium 50 MCG Oral Tablet] 90 tablet 3     Sig: TAKE 1 TABLET BY MOUTH  DAILY        Last Filled:  7/21/21    Patient Phone Number: [phone] (home)     Last appt: 11/17/2021   Next appt: Visit date not found    Last OARRS: No flowsheet data found.

## 2022-09-22 ENCOUNTER — TELEPHONE (OUTPATIENT)
Dept: PRIMARY CARE CLINIC | Age: 67
End: 2022-09-22

## 2022-10-06 ENCOUNTER — TELEPHONE (OUTPATIENT)
Dept: PRIMARY CARE CLINIC | Age: 67
End: 2022-10-06

## 2022-10-17 ENCOUNTER — OFFICE VISIT (OUTPATIENT)
Dept: PULMONOLOGY | Age: 67
End: 2022-10-17
Payer: MEDICARE

## 2022-10-17 VITALS
TEMPERATURE: 97.2 F | OXYGEN SATURATION: 98 % | HEART RATE: 60 BPM | HEIGHT: 62 IN | BODY MASS INDEX: 52.13 KG/M2 | DIASTOLIC BLOOD PRESSURE: 72 MMHG | RESPIRATION RATE: 16 BRPM | SYSTOLIC BLOOD PRESSURE: 106 MMHG

## 2022-10-17 DIAGNOSIS — J96.12 CHRONIC RESPIRATORY FAILURE WITH HYPERCAPNIA (HCC): ICD-10-CM

## 2022-10-17 DIAGNOSIS — E66.01 CLASS 3 SEVERE OBESITY WITHOUT SERIOUS COMORBIDITY WITH BODY MASS INDEX (BMI) OF 50.0 TO 59.9 IN ADULT, UNSPECIFIED OBESITY TYPE (HCC): Primary | ICD-10-CM

## 2022-10-17 DIAGNOSIS — Z99.89 DEPENDENCE ON OTHER ENABLING MACHINES AND DEVICES: ICD-10-CM

## 2022-10-17 DIAGNOSIS — J44.9 CHRONIC OBSTRUCTIVE PULMONARY DISEASE, UNSPECIFIED COPD TYPE (HCC): ICD-10-CM

## 2022-10-17 PROCEDURE — G8417 CALC BMI ABV UP PARAM F/U: HCPCS | Performed by: INTERNAL MEDICINE

## 2022-10-17 PROCEDURE — G8484 FLU IMMUNIZE NO ADMIN: HCPCS | Performed by: INTERNAL MEDICINE

## 2022-10-17 PROCEDURE — G8427 DOCREV CUR MEDS BY ELIG CLIN: HCPCS | Performed by: INTERNAL MEDICINE

## 2022-10-17 PROCEDURE — 1036F TOBACCO NON-USER: CPT | Performed by: INTERNAL MEDICINE

## 2022-10-17 PROCEDURE — 3023F SPIROM DOC REV: CPT | Performed by: INTERNAL MEDICINE

## 2022-10-17 PROCEDURE — 99214 OFFICE O/P EST MOD 30 MIN: CPT | Performed by: INTERNAL MEDICINE

## 2022-10-17 PROCEDURE — 1123F ACP DISCUSS/DSCN MKR DOCD: CPT | Performed by: INTERNAL MEDICINE

## 2022-10-17 PROCEDURE — G8399 PT W/DXA RESULTS DOCUMENT: HCPCS | Performed by: INTERNAL MEDICINE

## 2022-10-17 PROCEDURE — 1090F PRES/ABSN URINE INCON ASSESS: CPT | Performed by: INTERNAL MEDICINE

## 2022-10-17 PROCEDURE — 3017F COLORECTAL CA SCREEN DOC REV: CPT | Performed by: INTERNAL MEDICINE

## 2022-10-17 ASSESSMENT — ENCOUNTER SYMPTOMS
CHEST TIGHTNESS: 0
TROUBLE SWALLOWING: 0
SPUTUM PRODUCTION: 0
FREQUENT THROAT CLEARING: 0
SORE THROAT: 0
HOARSE VOICE: 0
EYES NEGATIVE: 1
COUGH: 1
SHORTNESS OF BREATH: 1
WHEEZING: 0
HEMOPTYSIS: 0
DIFFICULTY BREATHING: 0
HEARTBURN: 0
RHINORRHEA: 0
GASTROINTESTINAL NEGATIVE: 1
ALLERGIC/IMMUNOLOGIC NEGATIVE: 1

## 2022-10-17 ASSESSMENT — COPD QUESTIONNAIRES: COPD: 1

## 2022-10-17 NOTE — PATIENT INSTRUCTIONS
ASSESSMENT/PLAN:  1. Chronic obstructive pulmonary disease, unspecified COPD type ([facility]  2. Chronic respiratory failure with hypercapnia (HCC)  3. Dependence on other enabling machines and devices  4. Class 3 severe obesity without serious comorbidity with body mass index (BMI) of 50.0 to 59.9 in adult, unspecified obesity type (HCC)    Pt has COPD and elevated PCO2  No S&S of erin  Will need to start with bipap  Would suggest getting the bipap prior to d/c  On oxygen and will need this also  Would refer to dme to get both setup prior to d/c  Will need to wean oxygen  Will need to get bipap  Would suggest autobipap with ipap max of 25 and epap min of 8 with nasal mask/pillows  Was set up after hospitalization in 7/2019  Doing well    No need for inhalers    This was ordered:  autobipap was set ipap 15, epap min 4, ti max of 4.0 and min of 1.0, trigger and cycle low, ps of 1  Ok to use wedge for the bed    However it was set at :  bipap is set at 11/6  Using 100% of time  Using 9 h/night  ti max 2.0 and min 0.3  Trigger and cycle medium  No sleep apnea, ahi 0.3  tv is 603  Leak at 4 lpm  mv 14.7  Nasal pillows    I have access via [email or web address]  dme is [facility]    Last ABG done 11/18/19  Ref Range & Units 1mo ago   PH 7.35 - 7.45 7.486High   PCO2 34 - 46 mm HG 29.6Low   PO2 90 - 110 mm HG 76Low   BICARBONATE 21 - 29 mmol/L 21.8   BASE DEFICIT 0 - 2 mmol/L 1   O2 SAT 92 - 96 % 96   This ABG done in November was after 3 sticks patient was hyperventilating, that is the reason why this CO2 is so low    HCO3 was 24 on 1/15/20    HCO3 was 24 on 11/17/21    I have access via [email or web address]  dme is [facility]    Weight is coming down was 328 and then at 309 and at 295 at 285 to 280 to 285        Obesity  Will make referral for   Weight loss clinic        RTC in 6 months     Remember to bring a list of pulmonary medications and any CPAP or BiPAP machines to your next appointment with the office.      Please keep all of your future appointments scheduled by [address], [facility] office. Out of respect for other patients and providers, you may be asked to reschedule your appointment if you arrive later than your scheduled appointment time. Appointments cancelled less than 24hrs in advance will be considered a no show. Patients with three missed appointments within 1 year or four missed appointments within 2 years can be dismissed from the practice. Please be aware that our physicians are required to work in the Intensive Care Unit at [facility].  Your appointment may need to be rescheduled if they are designated to work during your appointment time. You may receive a survey regarding the care you received during your visit. Your input is valuable to us. We encourage you to complete and return your survey. We hope you will choose us in the future for your healthcare needs. Pt instructed of all future appointment dates & times, including radiology, labs, procedures & referrals. If procedures were scheduled preparation instructions provided. Instructions on future appointments with [facility] were given.

## 2022-10-17 NOTE — PROGRESS NOTES
Tiffanie Moss (:  1955) is a 75 y.o. female,Established patient, here for evaluation of the following chief complaint(s):  Sleep Apnea and COPD         ASSESSMENT/PLAN:  1. Chronic obstructive pulmonary disease, unspecified COPD type ([address]  2. Chronic respiratory failure with hypercapnia (HCC)  3. Dependence on other enabling machines and devices  4.  Class 3 severe obesity without serious comorbidity with body mass index (BMI) of 50.0 to 59.9 in adult, unspecified obesity type (HCC)    Pt has COPD and elevated PCO2  No S&S of erin  Will need to start with bipap  Would suggest getting the bipap prior to d/c  On oxygen and will need this also  Would refer to dme to get both setup prior to d/c  Will need to wean oxygen  Will need to get bipap  Would suggest autobipap with ipap max of 25 and epap min of 8 with nasal mask/pillows  Was set up after hospitalization in 2019  Doing well    No need for inhalers    This was ordered:  autobipap was set ipap 15, epap min 4, ti max of 4.0 and min of 1.0, trigger and cycle low, ps of 1  Ok to use wedge for the bed    However it was set at :  bipap is set at /  Using 100% of time  Using 9 h/night  ti max 2.0 and min 0.3  Trigger and cycle medium  No sleep apnea, ahi 0.3  tv is 603  Leak at 4 lpm  mv 14.7  Nasal pillows    I have access via [employer]  dme is [facility]    Last ABG done 19  Ref Range & Units 1mo ago   PH 7.35 - 7.45 7.486High   PCO2 34 - 46 mm HG 29.6Low   PO2 90 - 110 mm HG 76Low   BICARBONATE 21 - 29 mmol/L 21.8   BASE DEFICIT 0 - 2 mmol/L 1   O2 SAT 92 - 96 % 96   This ABG done in November was after 3 sticks patient was hyperventilating, that is the reason why this CO2 is so low    HCO3 was 24 on 1/15/20    HCO3 was 24 on 21    I have access via [employer]  dme is [facility]    Weight is coming down was 328 and then at 309 and at 295 at 285 to 280 to 285        Obesity  Will make referral for   Weight loss clinic        RTC in 6 months No follow-ups on file. I have personally reviewed and summarized the old records and/or obtained further history from someone other than the patient. I have independently reviewed the images and reviewed with patient    I have reviewed the lab tests, radiology reports and medications    I have downloaded and interpreted the cpap/bipap/pap data. I have made adjustments as described                 [id number] [facility] PULMONOLOGY, SLEEP & [address] [facility]  [address] [place] [address]  Dept: [phone]  Loc: [phone]     Diagnosis:  [] KENNETH (ICD-10: G47.33)  o CSA (ICD-10: G47.31)  [] Complex Sleep Apnea (ICD-10: G47.37)  []  (ICD-10: G47.37)  [] Hypoxemia (ICD-10: R09.02)  [x] COPD (J44.90)  [] Chronic Respiratory Failure with hypoxemia (J96.11)  [x] Chronicr Respiratory Failure with hypercapnia (J96.12)  [] Restrictive Lung Disease (J98.4)      [] New Rx (Device Preference: _________________________)     [] Change Order       [] Replace ___________  [] Clinical assessments and may include IN-Check device, spirometry and ETCO2 PRN    [] Discontinue Order -  [] CPAP    [] BPAP    [] PAP    [] Oxygen   /   AMA?  [] Yes   [] No              Therapy    AHI: ________ ADEBAYO: ________  LOW SpO2: ________%      DME: [facility]    DEVICE / SETTINGS RAMP / COMFORT INTERFACE   []  CPAP () Pressure    Ramp: _________ min's  [] Ramp to patient preference General PAP Supply Kit  Medicaid does not cover heated tubing  ([employer])  PAP Tubing:  [x] Heated ()- 1/3 mos                         [x] Regular () - 1/3 mos  [x] Disposable Water Chamber () - 1/6 mos  [x] Disposable Filter () - 2/mo  [x] Non-Disposable Filter () - 1/6 mos   []  BiLevel PAP ()           IPAP        []  BiLevel PAP with   ()       Backup Rate ()      EPAP Rate  [] Adjust FLEX to patient comfort       SUPPLEMENTAL OXYGEN  [] OXYGEN:      Liter/min: _________  [] Continuous        [] Nocturnal  [] Bleed into PAP Device      []  [address] [facility] Interface Kit    [x] Mask interface () - 1/3 mos  [x] Nasal Cushion () - 2/mo  [x] Nasal Pillows ()  -2/mo  [x] Headgear () - 1/6 mos   []  AutoBiLevel () Pressure  ()      Support           []  ResMed® IVAPS EPAP  [] Overnight Oximetry on Room Air  [] Overnight Oximetry on PAP Therapy    Target Pt Rate  Min PS      Target Va  Patient Ht  Ti Max                Ti Min        Rise time  Max PS  Trigger  Cycle  Epap  Epap max  Epap min  The patient has a history of:  [] Excessive Daytime Sleepiness  [] Insomnia  [] Impaired Cognition  [] Ischemic Heart Disease  [] Hypertension  [] Mood Disorders          [] History of Stroke  Additional Orders:______________________________________________________________________________________________________________________________________________________________________________    [] Titrate to comfort Full Face Mask Kit    [] Full face mask () - 1/3 mos  [] Full Face Cushion () - 1/mo  [] Headgear () - 1/6 mos                                           [] Respironics® ASV Advanced ()     EPAP Min  PS Min      EPAP Max  PS Max   Additional Supplies    [] Chin Strap ()  [] Heated Humidifier Kit ()  Mask Specifications:   [] Patient Preference      -or-            Brand:______________ Size:_______________   Type: __________________________________   [] Mask Refit:___________________________                                           Max Press  Ramp Time      Rate  Bi-flex: []1  []2  []3     [] Respironics® AVAPS: ()     IPAP Min  IPAP Max  Pressure Max  Epap max  Epap min  Rise time                      Avaps rate  EPAP   Additional Services    [] Annual PRN service and check of equipment  [] Routine service and check of equipment  [] Download and report compliance data   Tidal volume      Tigger                                     Rate                                         Inspiratory time                                                         The following equipment is Medically Necessary for the above stated patient. It is reasonable and necessary in reference to acceptable standards of medical practice for this condition, and is not prescribed as a convenience. Frequency of Use:    Daily                 Length of Need: 13 Months              o The patient requires BiLevel PAP and the following apply: []  The patient requires a Respiratory Assist Device (RAD) and the following apply:   o CPAP was tried and failed to meet therapeutic goals. [] CPAP was tried, but failed to meet therapeutic goals   o The prescribed mask interface has been properly fit, is the most comfortable to the patient and will be used with the BPAP device. [] The prescribed mask interface has been properly fit, is the most comfortable to the patient and will be used with the RAD.   o Current CPAP setting prevents patient from tolerating the therapy and lower CPAP settings fail to adequately control the symptoms of KENNETH, improve sleep quality, or reduce AHI to acceptable levels. [] Current CPAP setting prevent patient from tolerating the therapy and lower PAP settings fail to  adequately control KENNETH symptoms, improve sleep quality, or reduce AHI to acceptable levels.          [] There is significant improvement of the respiratory events on the RAD                                                                                                                                                                                                                                  Jessy Carlos MD NPI- [id number]     KOTKA- [id number]                    10/17/22       ____________________________                        _______________________           Physician Signature Date                                                     Subjective   SUBJECTIVE/OBJECTIVE:  Transfer of care from [facility] to [facility]  Initially seen at [address] [place] [address] pulmonary on 11/22/2021    Last seen at [facility] on 5/26/2021          Hospitalized in 7/2019  Seen there  D/c summary  The patient is a(n) 71year old female who was admitted for acute respiratory failure with hypoxemia. Patient presented to the emergency room with increasing shortness of breath. At the time of presentation the patient was found to have atrial fibrillation with RVR. Patient showed evidence of fluid overload with peripheral edema. She was admitted to telemetry and seen by cardiology. She was begun on IV Lasix. An echocardiogram was obtained and she was found to have an ejection fraction of 55-60%. She was also noted to have right ventricular hypertension and mild to moderate left ventricular hypertrophy. Patient's troponins were elevated at the time of admission. She underwent a stress test that showed dyskinesis involving the septum and apex. She was noted to have decreased perfusion in the cardiac apex. Overnight on day 2 of admission the patient had increasing oxygen requirements. She was placed on nonrebreather mask. She subsequently developed lethargy. Arterial Blood gas revealed hypercapnic respiratory failure. Patient was transferred emergently to the ICU and begun on mechanical ventilation. She was rapidly extubated after 24 hours and begun on BiPAP. Patient was transferred back to [place] [address]. IV Lasix was continued by constant infusion. Patient has had gradual improvement in her respiratory function. On day of discharge she is breathing comfortably on 2 L of O2. She is awake and alert. She has tolerated BiPAP. Patient was treated for urinary tract infection with IV ceftriaxone ×3 days for Escherichia coli UTI. Repeat culture was positive for E. Coli.  She will be discharged on levofloxacin 350 mg per day for 7 days. Patient's renal function remained stable. On the day prior to discharge the patient's creatinine started to increase. IV Lasix was discontinued and the patient was started on Demadex 50 mg by mouth twice a day. Patient was not started on anticoagulation due to iron deficiency anemia present on admission. Patient is planned for discharge to an extended care facility for subacute rehabilitation. Patient is significantly debilitated from 2 weeks of bed rest. Patient is agreeable to transfer. My plan  Principal Problem:  Acute respiratory failure with hypoxia (HCC)  Active Problems:  Atypical chest pain  Elevated troponin  Microcytic anemia  Elevated brain natriuretic peptide (BNP) level  Acute combined systolic and diastolic CHF, NYHA class 2 (HCC)  Paroxysmal atrial fibrillation (HCC)  T2DM (type 2 diabetes mellitus) ([facility]  Morbid obesity due to excess calories ([facility]        Discussion /Plan:        1. Pt has COPD and elevated PCO2  2. No S&S of erin  3. Will need to start with bipap  4. Would suggest getting the bipap prior to d/c  5. On oxygen and will need this also  6. Would refer to dme to get both setup prior to d/c  7. Will need to wean oxygen  8. Will need to get bipap  9. Would suggest autobipap with ipap max of 25 and epap min of 8 with nasal mask/pillows  10. May need oxygen       Having some issues with the mask and headache    Using during the day  Right now set at 10/3    Now has dreamwear  Not the full face mask    No bloating  No burping  Some ear popping, in the afternoon and evening hours    Doing better with the bipap    No dry mouth  \  Sleeping well  No chest pain  No blaotign  No burping    No ear popping    Less noise    Some talking    Some snoring  But sleeping    Using nasal dreamwear    Using wheelchair only outside         Was vaccinated with [employer] and caught Covid      COPD  She complains of cough and shortness of breath.  There is no chest tightness, difficulty breathing, frequent throat clearing, hemoptysis, hoarse voice, sputum production or wheezing. This is a chronic problem. The current episode started more than 1 year ago. The problem occurs rarely. The cough is non-productive. Pertinent negatives include no appetite change, chest pain, dyspnea on exertion, ear congestion, ear pain, fever, headaches, heartburn, malaise/fatigue, myalgias, nasal congestion, orthopnea, PND, postnasal drip, rhinorrhea, sneezing, sore throat, sweats, trouble swallowing or weight loss. Review of Systems   Constitutional: Negative. Negative for appetite change, fever, malaise/fatigue and weight loss. HENT: Negative. Negative for ear pain, hoarse voice, postnasal drip, rhinorrhea, sneezing, sore throat and trouble swallowing. Eyes: Negative. Respiratory:  Positive for cough and shortness of breath. Negative for hemoptysis, sputum production and wheezing. Cardiovascular: Negative. Negative for chest pain, dyspnea on exertion and PND. Gastrointestinal: Negative. Negative for heartburn. Endocrine: Negative. Genitourinary: Negative. Musculoskeletal: Negative. Negative for myalgias. Skin: Negative. Allergic/Immunologic: Negative. Neurological: Negative. Negative for headaches. Hematological: Negative. Psychiatric/Behavioral: Negative. Vitals:    10/17/22 1347   BP: 106/72   Site: Left Lower Arm   Position: Sitting   Cuff Size: Medium Adult   Pulse: 60   Resp: 16   Temp: 97.2 °F (36.2 °C)   TempSrc: Temporal   SpO2: 98%   Height: 5' 2\" (1.575 m)         Objective   Physical Exam  Vitals and nursing note reviewed. Constitutional:       General: She is not in acute distress. Appearance: Normal appearance. She is not ill-appearing. HENT:      Head: Normocephalic and atraumatic.       Right Ear: External ear normal.      Left Ear: External ear normal.      Nose: Nose normal.      Mouth/Throat:      Mouth: Mucous membranes are moist.      Pharynx: Oropharynx is clear. Comments: Mallampati 3  Eyes:      General: No scleral icterus. Extraocular Movements: Extraocular movements intact. Conjunctiva/sclera: Conjunctivae normal.      Pupils: Pupils are equal, round, and reactive to light. Cardiovascular:      Rate and Rhythm: Normal rate and regular rhythm. Pulses: Normal pulses. Heart sounds: Normal heart sounds. No murmur heard. No friction rub. Pulmonary:      Effort: No respiratory distress. Breath sounds: No stridor. No wheezing, rhonchi or rales. Abdominal:      General: Abdomen is flat. Bowel sounds are normal. There is no distension. Tenderness: There is no abdominal tenderness. There is no guarding. Musculoskeletal:         General: No swelling or tenderness. Normal range of motion. Cervical back: Normal range of motion and neck supple. No rigidity. Skin:     General: Skin is warm and dry. Coloration: Skin is not jaundiced. Neurological:      General: No focal deficit present. Mental Status: She is alert and oriented to person, place, and time. Mental status is at baseline. Cranial Nerves: No cranial nerve deficit. Sensory: No sensory deficit. Motor: No weakness. Gait: Gait normal.   Psychiatric:         Mood and Affect: Mood normal.         Thought Content:  Thought content normal.         Judgment: Judgment normal.                An electronic signature was used to authenticate this note.    --Christian Maldonado MD

## 2022-10-17 NOTE — LETTER
reason why this CO2 is so low    HCO3 was 24 on 1/15/20    HCO3 was 24 on 11/17/21    I have access via [employer]  dme is [facility]    Weight is coming down was 328 and then at 309 and at 295 at 285 to 280 to 285        Obesity  Will make referral for   Weight loss clinic        RTC in 6 months                    Thank you for allowing me to assist in the care of the MD Leonila Palacios MD

## 2022-10-17 NOTE — PROGRESS NOTES
MA Communication: The following orders are received by verbal communication from Clau Dial MD    Orders include:  Order faxed to [address] [place] [address]       Referral for Bariatric Sx.  Given to pt       6 mo fu scheduled 4/10/23

## 2022-12-06 ENCOUNTER — TELEPHONE (OUTPATIENT)
Dept: PRIMARY CARE CLINIC | Age: 67
End: 2022-12-06

## 2023-02-20 ENCOUNTER — TELEPHONE (OUTPATIENT)
Dept: FAMILY MEDICINE CLINIC | Age: 68
End: 2023-02-20

## 2023-02-20 NOTE — TELEPHONE ENCOUNTER
----- Message from Mayra Wan sent at 2/20/2023  2:05 PM EST -----  Subject: Appointment Request    Reason for Call: Established Patient Appointment needed: Routine Medicare   AWV    QUESTIONS    Reason for appointment request? No appointments available during search     Additional Information for Provider? Mrs. Ramona Srivastava called today to ask   if she can make her AWV on May 8th, 2023. She has another appt. with Dr. Emanuel Metcalf pulmonologist at 1:30pm and it would be much easier for her to come   in on the same day. She is also asking for labs to be done on this day.    Thank you.   ---------------------------------------------------------------------------  --------------  Tabatha Ortiz INFO  [phone]; OK to leave message on voicemail  ---------------------------------------------------------------------------  --------------  SCRIPT ANSWERS  COVID Screen: [place]

## 2023-05-01 ENCOUNTER — TELEPHONE (OUTPATIENT)
Dept: FAMILY MEDICINE CLINIC | Age: 68
End: 2023-05-01

## 2023-05-01 DIAGNOSIS — E55.9 VITAMIN D DEFICIENCY: ICD-10-CM

## 2023-05-01 DIAGNOSIS — E21.3 HYPERPARATHYROIDISM (HCC): Primary | ICD-10-CM

## 2023-05-01 DIAGNOSIS — I10 BENIGN HYPERTENSION: ICD-10-CM

## 2023-05-01 DIAGNOSIS — R73.03 PRE-DIABETES: ICD-10-CM

## 2023-05-01 RX ORDER — DILTIAZEM HYDROCHLORIDE 120 MG/1
CAPSULE, COATED, EXTENDED RELEASE ORAL
Qty: 90 CAPSULE | Refills: 0 | Status: SHIPPED | OUTPATIENT
Start: 2023-05-01

## 2023-05-01 RX ORDER — SPIRONOLACTONE 25 MG/1
25 TABLET ORAL DAILY
Qty: 90 TABLET | Refills: 0 | Status: SHIPPED | OUTPATIENT
Start: 2023-05-01

## 2023-05-01 RX ORDER — SIMVASTATIN 20 MG
TABLET ORAL
Qty: 90 TABLET | Refills: 0 | Status: SHIPPED | OUTPATIENT
Start: 2023-05-01

## 2023-05-01 RX ORDER — TORSEMIDE 10 MG/1
20 TABLET ORAL DAILY
Qty: 180 TABLET | Refills: 0 | Status: SHIPPED | OUTPATIENT
Start: 2023-05-01

## 2023-05-01 RX ORDER — LEVOTHYROXINE SODIUM 0.05 MG/1
50 TABLET ORAL DAILY
Qty: 90 TABLET | Refills: 0 | Status: SHIPPED | OUTPATIENT
Start: 2023-05-01

## 2023-05-01 NOTE — TELEPHONE ENCOUNTER
----- Message from Brionna Agustín sent at 5/1/2023  8:48 AM EDT -----  Subject: Referral Request    Reason for referral request? lab work prior to AWV. Provider patient wants to be referred to(if known):     Provider Phone Number(if known): Additional Information for Provider? call if lab work needs scheduled.   ---------------------------------------------------------------------------  --------------  Gabby MARCIAL    [phone]; OK to leave message on voicemail  ---------------------------------------------------------------------------  --------------

## 2023-05-08 ENCOUNTER — OFFICE VISIT (OUTPATIENT)
Dept: FAMILY MEDICINE CLINIC | Age: 68
End: 2023-05-08

## 2023-05-08 ENCOUNTER — OFFICE VISIT (OUTPATIENT)
Dept: PULMONOLOGY | Age: 68
End: 2023-05-08
Payer: MEDICARE

## 2023-05-08 VITALS
DIASTOLIC BLOOD PRESSURE: 88 MMHG | WEIGHT: 282 LBS | OXYGEN SATURATION: 98 % | HEIGHT: 62 IN | SYSTOLIC BLOOD PRESSURE: 138 MMHG | BODY MASS INDEX: 51.89 KG/M2 | RESPIRATION RATE: 24 BRPM | HEART RATE: 91 BPM | TEMPERATURE: 97.9 F

## 2023-05-08 VITALS
OXYGEN SATURATION: 97 % | BODY MASS INDEX: 51.89 KG/M2 | DIASTOLIC BLOOD PRESSURE: 88 MMHG | HEIGHT: 62 IN | HEART RATE: 78 BPM | WEIGHT: 282 LBS | SYSTOLIC BLOOD PRESSURE: 138 MMHG

## 2023-05-08 DIAGNOSIS — E21.3 HYPERPARATHYROIDISM (HCC): ICD-10-CM

## 2023-05-08 DIAGNOSIS — Z00.00 MEDICARE ANNUAL WELLNESS VISIT, SUBSEQUENT: Primary | ICD-10-CM

## 2023-05-08 DIAGNOSIS — M17.10 ARTHRITIS OF KNEE: ICD-10-CM

## 2023-05-08 DIAGNOSIS — E55.9 VITAMIN D DEFICIENCY: ICD-10-CM

## 2023-05-08 DIAGNOSIS — I10 BENIGN HYPERTENSION: ICD-10-CM

## 2023-05-08 DIAGNOSIS — G89.29 CHRONIC RIGHT SHOULDER PAIN: ICD-10-CM

## 2023-05-08 DIAGNOSIS — I48.0 PAROXYSMAL ATRIAL FIBRILLATION (HCC): ICD-10-CM

## 2023-05-08 DIAGNOSIS — Z99.89 DEPENDENCE ON OTHER ENABLING MACHINES AND DEVICES: ICD-10-CM

## 2023-05-08 DIAGNOSIS — J44.9 CHRONIC OBSTRUCTIVE PULMONARY DISEASE, UNSPECIFIED COPD TYPE (HCC): Primary | ICD-10-CM

## 2023-05-08 DIAGNOSIS — J96.12 CHRONIC RESPIRATORY FAILURE WITH HYPERCAPNIA (HCC): ICD-10-CM

## 2023-05-08 DIAGNOSIS — J44.9 CHRONIC OBSTRUCTIVE PULMONARY DISEASE, UNSPECIFIED COPD TYPE (HCC): ICD-10-CM

## 2023-05-08 DIAGNOSIS — R73.03 PRE-DIABETES: ICD-10-CM

## 2023-05-08 DIAGNOSIS — M25.511 CHRONIC RIGHT SHOULDER PAIN: ICD-10-CM

## 2023-05-08 LAB
25(OH)D3 SERPL-MCNC: 24.2 NG/ML
ALBUMIN SERPL-MCNC: 4.7 G/DL (ref 3.4–5)
ALBUMIN/GLOB SERPL: 1.6 {RATIO} (ref 1.1–2.2)
ALP SERPL-CCNC: 46 U/L (ref 40–129)
ALT SERPL-CCNC: 10 U/L (ref 10–40)
ANION GAP SERPL CALCULATED.3IONS-SCNC: 15 MMOL/L (ref 3–16)
AST SERPL-CCNC: 14 U/L (ref 15–37)
BILIRUB SERPL-MCNC: 0.4 MG/DL (ref 0–1)
BUN SERPL-MCNC: 18 MG/DL (ref 7–20)
CALCIUM SERPL-MCNC: 9.6 MG/DL (ref 8.3–10.6)
CHLORIDE SERPL-SCNC: 107 MMOL/L (ref 99–110)
CHOLEST SERPL-MCNC: 166 MG/DL (ref 0–199)
CO2 SERPL-SCNC: 20 MMOL/L (ref 21–32)
CREAT SERPL-MCNC: 0.8 MG/DL (ref 0.6–1.2)
DEPRECATED RDW RBC AUTO: 14 % (ref 12.4–15.4)
GFR SERPLBLD CREATININE-BSD FMLA CKD-EPI: >60 ML/MIN/{1.73_M2}
GLUCOSE SERPL-MCNC: 108 MG/DL (ref 70–99)
HCT VFR BLD AUTO: 41.1 % (ref 36–48)
HDLC SERPL-MCNC: 51 MG/DL (ref 40–60)
HGB BLD-MCNC: 13.8 G/DL (ref 12–16)
LDLC SERPL CALC-MCNC: 84 MG/DL
MCH RBC QN AUTO: 29.2 PG (ref 26–34)
MCHC RBC AUTO-ENTMCNC: 33.7 G/DL (ref 31–36)
MCV RBC AUTO: 86.7 FL (ref 80–100)
PLATELET # BLD AUTO: 200 K/UL (ref 135–450)
PMV BLD AUTO: 10 FL (ref 5–10.5)
POTASSIUM SERPL-SCNC: 4.5 MMOL/L (ref 3.5–5.1)
PROT SERPL-MCNC: 7.6 G/DL (ref 6.4–8.2)
PTH-INTACT SERPL-MCNC: 77 PG/ML (ref 14–72)
RBC # BLD AUTO: 4.73 M/UL (ref 4–5.2)
SODIUM SERPL-SCNC: 142 MMOL/L (ref 136–145)
TRIGL SERPL-MCNC: 156 MG/DL (ref 0–150)
VLDLC SERPL CALC-MCNC: 31 MG/DL
WBC # BLD AUTO: 6.7 K/UL (ref 4–11)

## 2023-05-08 PROCEDURE — 3023F SPIROM DOC REV: CPT | Performed by: INTERNAL MEDICINE

## 2023-05-08 PROCEDURE — G8399 PT W/DXA RESULTS DOCUMENT: HCPCS | Performed by: INTERNAL MEDICINE

## 2023-05-08 PROCEDURE — 1036F TOBACCO NON-USER: CPT | Performed by: INTERNAL MEDICINE

## 2023-05-08 PROCEDURE — G8417 CALC BMI ABV UP PARAM F/U: HCPCS | Performed by: INTERNAL MEDICINE

## 2023-05-08 PROCEDURE — 3017F COLORECTAL CA SCREEN DOC REV: CPT | Performed by: INTERNAL MEDICINE

## 2023-05-08 PROCEDURE — 99214 OFFICE O/P EST MOD 30 MIN: CPT | Performed by: INTERNAL MEDICINE

## 2023-05-08 PROCEDURE — G8427 DOCREV CUR MEDS BY ELIG CLIN: HCPCS | Performed by: INTERNAL MEDICINE

## 2023-05-08 PROCEDURE — 1123F ACP DISCUSS/DSCN MKR DOCD: CPT | Performed by: INTERNAL MEDICINE

## 2023-05-08 PROCEDURE — 3079F DIAST BP 80-89 MM HG: CPT | Performed by: INTERNAL MEDICINE

## 2023-05-08 PROCEDURE — 1090F PRES/ABSN URINE INCON ASSESS: CPT | Performed by: INTERNAL MEDICINE

## 2023-05-08 PROCEDURE — 3075F SYST BP GE 130 - 139MM HG: CPT | Performed by: INTERNAL MEDICINE

## 2023-05-08 SDOH — ECONOMIC STABILITY: INCOME INSECURITY: HOW HARD IS IT FOR YOU TO PAY FOR THE VERY BASICS LIKE FOOD, HOUSING, MEDICAL CARE, AND HEATING?: NOT HARD AT ALL

## 2023-05-08 SDOH — ECONOMIC STABILITY: FOOD INSECURITY: WITHIN THE PAST 12 MONTHS, THE FOOD YOU BOUGHT JUST DIDN'T LAST AND YOU DIDN'T HAVE MONEY TO GET MORE.: NEVER TRUE

## 2023-05-08 SDOH — ECONOMIC STABILITY: FOOD INSECURITY: WITHIN THE PAST 12 MONTHS, YOU WORRIED THAT YOUR FOOD WOULD RUN OUT BEFORE YOU GOT MONEY TO BUY MORE.: NEVER TRUE

## 2023-05-08 SDOH — ECONOMIC STABILITY: HOUSING INSECURITY
IN THE LAST 12 MONTHS, WAS THERE A TIME WHEN YOU DID NOT HAVE A STEADY PLACE TO SLEEP OR SLEPT IN A SHELTER (INCLUDING NOW)?: NO

## 2023-05-08 ASSESSMENT — ENCOUNTER SYMPTOMS
RHINORRHEA: 0
HEMOPTYSIS: 0
DIFFICULTY BREATHING: 0
COUGH: 1
FREQUENT THROAT CLEARING: 0
EYES NEGATIVE: 1
TROUBLE SWALLOWING: 0
GASTROINTESTINAL NEGATIVE: 1
ALLERGIC/IMMUNOLOGIC NEGATIVE: 1
WHEEZING: 0
HOARSE VOICE: 0
SORE THROAT: 0
HEARTBURN: 0
SHORTNESS OF BREATH: 1
CHEST TIGHTNESS: 0
SPUTUM PRODUCTION: 0

## 2023-05-08 ASSESSMENT — PATIENT HEALTH QUESTIONNAIRE - PHQ9
SUM OF ALL RESPONSES TO PHQ QUESTIONS 1-9: 0
1. LITTLE INTEREST OR PLEASURE IN DOING THINGS: 0
SUM OF ALL RESPONSES TO PHQ QUESTIONS 1-9: 0
SUM OF ALL RESPONSES TO PHQ QUESTIONS 1-9: 0
SUM OF ALL RESPONSES TO PHQ9 QUESTIONS 1 & 2: 0
SUM OF ALL RESPONSES TO PHQ QUESTIONS 1-9: 0
2. FEELING DOWN, DEPRESSED OR HOPELESS: 0

## 2023-05-08 ASSESSMENT — LIFESTYLE VARIABLES
HOW MANY STANDARD DRINKS CONTAINING ALCOHOL DO YOU HAVE ON A TYPICAL DAY: 1 OR 2
HOW OFTEN DO YOU HAVE A DRINK CONTAINING ALCOHOL: MONTHLY OR LESS

## 2023-05-08 ASSESSMENT — COPD QUESTIONNAIRES: COPD: 1

## 2023-05-08 NOTE — PROGRESS NOTES
Pham Gonzalez (:  1955) is a 70 y.o. female,Established patient, here for evaluation of the following chief complaint(s):  Sleep Apnea and COPD         ASSESSMENT/PLAN:  1. Chronic obstructive pulmonary disease, unspecified COPD type ([facility]  2. Body mass index (BMI) 50.0-59.9, adult ([facility]  3. Chronic respiratory failure with hypercapnia (HCC)  4. Dependence on other enabling machines and devices    Pt has COPD and elevated PCO2  No S&S of erin  Will need to start with bipap  Would suggest getting the bipap prior to d/c  On oxygen and will need this also  Would refer to dme to get both setup prior to d/c  Will need to wean oxygen  Will need to get bipap  Would suggest autobipap with ipap max of 25 and epap min of 8 with nasal mask/pillows  Was set up after hospitalization in 2019  Doing well    No need for inhalers    This was ordered:  autobipap was set ipap 15, epap min 4, ti max of 4.0 and min of 1.0, trigger and cycle low, ps of 1  Ok to use wedge for the bed    SN: [id number]    However it was set at :  bipap is set at 11/6  Using 100% of time  Using 9.8 h/night  ti max 2.0 and min 0.3  Trigger and cycle medium  No sleep apnea, ahi 0.3  tv is 565  Leak at 4 lpm  mv 14.0  Nasal pillows    I have access via [employer]  dme is [employer]    Last ABG done 19  Ref Range & Units 1mo ago   PH 7.35 - 7.45 7.486High   PCO2 34 - 46 mm HG 29.6Low   PO2 90 - 110 mm HG 76Low   BICARBONATE 21 - 29 mmol/L 21.8   BASE DEFICIT 0 - 2 mmol/L 1   O2 SAT 92 - 96 % 96   This ABG done in November was after 3 sticks patient was hyperventilating, that is the reason why this CO2 is so low    HCO3 was 24 on 1/15/20    HCO3 was 24 on 21    I have access via [employer]  dme is [facility]    Weight is coming down was 328 and then at 309 and at 295 at 285 to 280 to 285 to 282        Obesity  Will make referral for   Weight loss clinic  Will see Dr. Beulah Homans      RTC in 6 months              No follow-ups on file.         I have

## 2023-05-08 NOTE — PROGRESS NOTES
MA Communication:   The following orders are received by verbal communication from Raz Collins MD    Orders include:  Order faxed to [facility]       6 mo fu scheduled 1120/23

## 2023-05-08 NOTE — PROGRESS NOTES
(CELEXA) 20 MG tablet TAKE 1 TABLET BY MOUTH DAILY  Patient not taking: Reported on 5/8/2023  Felicia Rosales MD       [facility] (Including outside providers/suppliers regularly involved in providing care):   Patient Care Team:  Felicia Rosales MD as PCP - General (Family Medicine)  Felicia Rosales MD as PCP - Empaneled Provider  Dwight Garduno DO (Family Medicine)  Dwight Garduno DO (Family Medicine)  Dwight Garduno DO (Family Medicine)     Reviewed and updated this visit:  Tobacco  Allergies  Meds  Med Hx  Surg Hx  Soc Hx  Fam Hx             Felicia Rosales MD

## 2023-05-08 NOTE — PATIENT INSTRUCTIONS
Remember to bring a list of pulmonary medications and any CPAP or BiPAP machines to your next appointment with the office. Please keep all of your future appointments scheduled by [facility] office. Out of respect for other patients and providers, you may be asked to reschedule your appointment if you arrive later than your scheduled appointment time. Appointments cancelled less than 24hrs in advance will be considered a no show. Patients with three missed appointments within 1 year or four missed appointments within 2 years can be dismissed from the practice. Please be aware that our physicians are required to work in the Intensive Care Unit at [facility].  Your appointment may need to be rescheduled if they are designated to work during your appointment time. You may receive a survey regarding the care you received during your visit. Your input is valuable to us. We encourage you to complete and return your survey. We hope you will choose us in the future for your healthcare needs. Pt instructed of all future appointment dates & times, including radiology, labs, procedures & referrals. If procedures were scheduled preparation instructions provided. Instructions on future appointments with [facility] Pulmonary were given.

## 2023-05-08 NOTE — PATIENT INSTRUCTIONS
adult-strength or 2 to 4 low-dose aspirin. Wait for an ambulance. Do not try to drive yourself. Watch closely for changes in your health, and be sure to contact your doctor if you have any problems. Where can you learn more? Go to http://www.bynum.com/ and enter F075 to learn more about \"A Healthy Heart: Care Instructions. \"  Current as of: September 7, 2022               Content Version: 13.6  © 5893-8589 [employer]wise, [employer]. Care instructions adapted under license by [facility] ([facility]). If you have questions about a medical condition or this instruction, always ask your healthcare professional. [place] [address] any warranty or liability for your use of this information. Personalized Preventive Plan for Gogo Isaac - 5/8/2023  Medicare offers a range of preventive health benefits. Some of the tests and screenings are paid in full while other may be subject to a deductible, co-insurance, and/or copay. Some of these benefits include a comprehensive review of your medical history including lifestyle, illnesses that may run in your family, and various assessments and screenings as appropriate. After reviewing your medical record and screening and assessments performed today your provider may have ordered immunizations, labs, imaging, and/or referrals for you. A list of these orders (if applicable) as well as your Preventive Care list are included within your After Visit Summary for your review. Other Preventive Recommendations:    A preventive eye exam performed by an eye specialist is recommended every 1-2 years to screen for glaucoma; cataracts, macular degeneration, and other eye disorders. A preventive dental visit is recommended every 6 months. Try to get at least 150 minutes of exercise per week or 10,000 steps per day on a pedometer . Order or download the FREE \"Exercise & Physical Activity: Your Everyday Guide\" from [employer].

## 2023-05-09 LAB
EST. AVERAGE GLUCOSE BLD GHB EST-MCNC: 122.6 MG/DL
HBA1C MFR BLD: 5.9 %

## 2023-07-21 RX ORDER — LEVOTHYROXINE SODIUM 0.05 MG/1
50 TABLET ORAL DAILY
Qty: 90 TABLET | Refills: 3 | Status: SHIPPED | OUTPATIENT
Start: 2023-07-21

## 2023-07-21 RX ORDER — SIMVASTATIN 20 MG
TABLET ORAL
Qty: 90 TABLET | Refills: 3 | Status: SHIPPED | OUTPATIENT
Start: 2023-07-21

## 2023-07-21 RX ORDER — DILTIAZEM HYDROCHLORIDE 120 MG/1
CAPSULE, COATED, EXTENDED RELEASE ORAL
Qty: 90 CAPSULE | Refills: 3 | Status: SHIPPED | OUTPATIENT
Start: 2023-07-21

## 2023-07-21 RX ORDER — TORSEMIDE 10 MG/1
20 TABLET ORAL DAILY
Qty: 180 TABLET | Refills: 3 | Status: SHIPPED | OUTPATIENT
Start: 2023-07-21

## 2023-07-21 RX ORDER — SPIRONOLACTONE 25 MG/1
25 TABLET ORAL DAILY
Qty: 90 TABLET | Refills: 3 | Status: SHIPPED | OUTPATIENT
Start: 2023-07-21

## 2023-07-21 NOTE — TELEPHONE ENCOUNTER
Medication:   Requested Prescriptions     Pending Prescriptions Disp Refills    dilTIAZem (CARDIZEM CD) 120 MG extended release capsule [Pharmacy Med Name: DilTIAZem CD CAP 120MG/24HR] 90 capsule 3     Sig: TAKE 1 CAPSULE BY MOUTH DAILY DO NOT CRUSH OR BREAK    simvastatin (ZOCOR) 20 MG tablet [Pharmacy Med Name: Simvastatin 20 MG Oral Tablet] 90 tablet 3     Sig: TAKE 1 TABLET BY MOUTH AT NIGHT    levothyroxine (SYNTHROID) 50 MCG tablet [Pharmacy Med Name: Levothyroxine Sodium 50 MCG Oral Tablet] 90 tablet 3     Sig: TAKE 1 TABLET BY MOUTH DAILY    torsemide (DEMADEX) 10 MG tablet [Pharmacy Med Name: TORSEMIDE  10MG  TAB] 180 tablet 3     Sig: TAKE 2 TABLETS BY MOUTH DAILY    spironolactone (ALDACTONE) 25 MG tablet [Pharmacy Med Name: Spironolactone 25 MG Oral Tablet] 90 tablet 3     Sig: TAKE 1 TABLET BY MOUTH DAILY        Last Filled: 5/1/2023   Last appt: 5/8/2023   Next appt: none

## 2023-11-20 ENCOUNTER — OFFICE VISIT (OUTPATIENT)
Dept: PULMONOLOGY | Age: 68
End: 2023-11-20
Payer: MEDICARE

## 2023-11-20 VITALS
BODY MASS INDEX: 52.81 KG/M2 | RESPIRATION RATE: 18 BRPM | WEIGHT: 287 LBS | OXYGEN SATURATION: 97 % | HEIGHT: 62 IN | TEMPERATURE: 97 F | HEART RATE: 104 BPM

## 2023-11-20 DIAGNOSIS — J44.9 CHRONIC OBSTRUCTIVE PULMONARY DISEASE, UNSPECIFIED COPD TYPE (HCC): Primary | ICD-10-CM

## 2023-11-20 DIAGNOSIS — Z99.89 DEPENDENCE ON OTHER ENABLING MACHINES AND DEVICES: ICD-10-CM

## 2023-11-20 DIAGNOSIS — J96.12 CHRONIC RESPIRATORY FAILURE WITH HYPERCAPNIA (HCC): ICD-10-CM

## 2023-11-20 PROCEDURE — 99214 OFFICE O/P EST MOD 30 MIN: CPT | Performed by: INTERNAL MEDICINE

## 2023-11-20 PROCEDURE — 1123F ACP DISCUSS/DSCN MKR DOCD: CPT | Performed by: INTERNAL MEDICINE

## 2023-11-20 ASSESSMENT — COPD QUESTIONNAIRES: COPD: 1

## 2023-11-20 ASSESSMENT — ENCOUNTER SYMPTOMS
EYES NEGATIVE: 1
HOARSE VOICE: 0
SHORTNESS OF BREATH: 1
CHEST TIGHTNESS: 0
HEMOPTYSIS: 0
GASTROINTESTINAL NEGATIVE: 1
SPUTUM PRODUCTION: 0
TROUBLE SWALLOWING: 0
SORE THROAT: 0
FREQUENT THROAT CLEARING: 0
DIFFICULTY BREATHING: 0
HEARTBURN: 0
WHEEZING: 0
ALLERGIC/IMMUNOLOGIC NEGATIVE: 1
COUGH: 1
RHINORRHEA: 0

## 2023-11-20 NOTE — PATIENT INSTRUCTIONS
ASSESSMENT/PLAN:  1. Chronic obstructive pulmonary disease, unspecified COPD type ([address])  2. Chronic respiratory failure with hypercapnia (HCC)  3. Dependence on other enabling machines and devices  4.  Body mass index (BMI) 50.0-59.9, adult (HCC)      Chronic respiratory failure, COPD, hypercapnia, dependency on enabling device  Pt has COPD and elevated PCO2  No S&S of erin  Will need to start with bipap  Would suggest getting the bipap prior to d/c  On oxygen and will need this also  Would refer to dme to get both setup prior to d/c    Will need to get bipap  Would suggest autobipap with ipap max of 25 and epap min of 8 with nasal mask/pillows  Was set up after hospitalization in 7/2019  Doing well    No need for inhalers    This was ordered:  autobipap was set ipap 15, epap min 4, ti max of 4.0 and min of 1.0, trigger and cycle low, ps of 1  Ok to use wedge for the bed    SN: [id number]    However it was set at :  bipap is set at 11/6  Using 100% of time  Using 9.8 h/night  ti max 2.0 and min 0.3  Trigger and cycle medium    No sleep apnea, ahi 0.3  tv is 611  Leak at 7.9 lpm  mv 14.2  Nasal pillows    I have access via [employer]  dme is [facility]    Last ABG done 11/18/19  Ref Range & Units 1mo ago   PH 7.35 - 7.45 7.486High   PCO2 34 - 46 mm HG 29.6Low   PO2 90 - 110 mm HG 76Low   BICARBONATE 21 - 29 mmol/L 21.8   BASE DEFICIT 0 - 2 mmol/L 1   O2 SAT 92 - 96 % 96   This ABG done in November was after 3 sticks patient was hyperventilating, that is the reason why this CO2 is so low    HCO3 was 24 on 1/15/20    HCO3 was 24 on 11/17/21    HCO3 was 20 on 5/8/23    I have access via [employer]  dme is [facility]        Obesity  Will make referral for   Weight loss clinic  Will see Dr. Radha Vera    Weight is coming down was 328 and then at 309 and at 295 at 285 to 280 to 285 to 282 to 287      RTC in 6 months    Remember to bring a list of pulmonary medications and any CPAP or BiPAP machines to your next appointment with the

## 2023-11-20 NOTE — PROGRESS NOTES
Maki Qiu (:  1955) is a 70 y.o. female,Established patient, here for evaluation of the following chief complaint(s):  Follow-up (6 month), Sleep Apnea, Asthma, and COPD         ASSESSMENT/PLAN:  1. Chronic obstructive pulmonary disease, unspecified COPD type ([address])  2. Chronic respiratory failure with hypercapnia (HCC)  3. Dependence on other enabling machines and devices  4.  Body mass index (BMI) 50.0-59.9, adult (HCC)      Chronic respiratory failure, COPD, hypercapnia, dependency on enabling device  Pt has COPD and elevated PCO2  No S&S of erin  Will need to start with bipap  Would suggest getting the bipap prior to d/c  On oxygen and will need this also  Would refer to dme to get both setup prior to d/c    Will need to get bipap  Would suggest autobipap with ipap max of 25 and epap min of 8 with nasal mask/pillows  Was set up after hospitalization in 2019  Doing well    No need for inhalers    This was ordered:  autobipap was set ipap 15, epap min 4, ti max of 4.0 and min of 1.0, trigger and cycle low, ps of 1  Ok to use wedge for the bed    SN: [id number]    However it was set at :  bipap is set at   Using 100% of time  Using 9.8 h/night  ti max 2.0 and min 0.3  Trigger and cycle medium    No sleep apnea, ahi 0.3  tv is 611  Leak at 7.9 lpm  mv 14.2  Nasal pillows    I have access via [employer]  tenisha is [facility]    Last ABG done 19  Ref Range & Units 1mo ago   PH 7.35 - 7.45 7.486High   PCO2 34 - 46 mm HG 29.6Low   PO2 90 - 110 mm HG 76Low   BICARBONATE 21 - 29 mmol/L 21.8   BASE DEFICIT 0 - 2 mmol/L 1   O2 SAT 92 - 96 % 96   This ABG done in November was after 3 sticks patient was hyperventilating, that is the reason why this CO2 is so low    HCO3 was 24 on 1/15/20    HCO3 was 24 on 21    HCO3 was 20 on 23    I have access via [employer]  dme is [employer]        Obesity  Will make referral for   Weight loss clinic  Will see Dr. Gerlean Share    Weight is coming down was 328 and then at

## 2023-11-20 NOTE — PROGRESS NOTES
MA Communication:   The following orders are received by verbal communication from Harley López MD    Orders include:  6 month f/u, orders to DME

## 2024-03-01 ENCOUNTER — TELEPHONE (OUTPATIENT)
Dept: FAMILY MEDICINE CLINIC | Age: 69
End: 2024-03-01

## 2024-03-01 NOTE — TELEPHONE ENCOUNTER
----- Message from Kinga Coyle sent at 3/1/2024  2:02 PM EST -----  Subject: Referral Request    Reason for referral request? Pt would like labs ordered for her upcoming   AWV in May. Please call pt with any questions.  Provider patient wants to be referred to(if known):     Provider Phone Number(if known):    Additional Information for Provider?   ---------------------------------------------------------------------------  --------------  CALL BACK INFO    [phone]; OK to leave message on voicemail  ---------------------------------------------------------------------------  --------------

## 2024-03-05 ENCOUNTER — PATIENT MESSAGE (OUTPATIENT)
Dept: PULMONOLOGY | Age: 69
End: 2024-03-05

## 2024-04-29 RX ORDER — SIMVASTATIN 20 MG
TABLET ORAL
Qty: 100 TABLET | Refills: 2 | Status: SHIPPED | OUTPATIENT
Start: 2024-04-29

## 2024-04-29 RX ORDER — TORSEMIDE 10 MG/1
20 TABLET ORAL DAILY
Qty: 200 TABLET | Refills: 2 | Status: SHIPPED | OUTPATIENT
Start: 2024-04-29

## 2024-04-29 RX ORDER — DILTIAZEM HYDROCHLORIDE 120 MG/1
CAPSULE, COATED, EXTENDED RELEASE ORAL
Qty: 100 CAPSULE | Refills: 2 | Status: SHIPPED | OUTPATIENT
Start: 2024-04-29

## 2024-04-29 RX ORDER — SPIRONOLACTONE 25 MG/1
25 TABLET ORAL DAILY
Qty: 100 TABLET | Refills: 2 | Status: SHIPPED | OUTPATIENT
Start: 2024-04-29

## 2024-04-29 RX ORDER — LEVOTHYROXINE SODIUM 0.05 MG/1
50 TABLET ORAL DAILY
Qty: 100 TABLET | Refills: 2 | Status: SHIPPED | OUTPATIENT
Start: 2024-04-29

## 2024-04-29 NOTE — TELEPHONE ENCOUNTER
Medication:   Requested Prescriptions     Pending Prescriptions Disp Refills    dilTIAZem (CARDIZEM CD) 120 MG extended release capsule [Pharmacy Med Name: DilTIAZem CD CAP 120MG/24HR] 100 capsule 2     Sig: TAKE 1 CAPSULE BY MOUTH DAILY  AND DO NOT CRUSH OR BREAK    torsemide (DEMADEX) 10 MG tablet [Pharmacy Med Name: TORSEMIDE  10MG  TAB] 200 tablet 2     Sig: TAKE 2 TABLETS BY MOUTH DAILY    levothyroxine (SYNTHROID) 50 MCG tablet [Pharmacy Med Name: Levothyroxine Sodium 50 MCG Oral Tablet] 100 tablet 2     Sig: TAKE 1 TABLET BY MOUTH DAILY    simvastatin (ZOCOR) 20 MG tablet [Pharmacy Med Name: Simvastatin 20 MG Oral Tablet] 100 tablet 2     Sig: TAKE 1 TABLET BY MOUTH AT NIGHT    spironolactone (ALDACTONE) 25 MG tablet [Pharmacy Med Name: Spironolactone 25 MG Oral Tablet] 100 tablet 2     Sig: TAKE 1 TABLET BY MOUTH DAILY        Last Filled:  7/21/23    Patient Phone Number: [phone] (home)     Last appt: 5/8/2023   Next appt: 5/13/2024    Last OARRS:        No data to display

## 2024-05-29 DIAGNOSIS — I10 BENIGN HYPERTENSION: ICD-10-CM

## 2024-05-29 DIAGNOSIS — R73.03 PRE-DIABETES: ICD-10-CM

## 2024-05-29 DIAGNOSIS — E21.3 HYPERPARATHYROIDISM (HCC): ICD-10-CM

## 2024-05-29 DIAGNOSIS — E55.9 VITAMIN D DEFICIENCY: ICD-10-CM

## 2024-05-29 LAB
DEPRECATED RDW RBC AUTO: 14.2 % (ref 12.4–15.4)
HCT VFR BLD AUTO: 37.5 % (ref 36–48)
HGB BLD-MCNC: 12.4 G/DL (ref 12–16)
MCH RBC QN AUTO: 28.8 PG (ref 26–34)
MCHC RBC AUTO-ENTMCNC: 33.1 G/DL (ref 31–36)
MCV RBC AUTO: 87 FL (ref 80–100)
PLATELET # BLD AUTO: 195 K/UL (ref 135–450)
PMV BLD AUTO: 10.3 FL (ref 5–10.5)
RBC # BLD AUTO: 4.3 M/UL (ref 4–5.2)
WBC # BLD AUTO: 5.8 K/UL (ref 4–11)

## 2024-05-30 LAB
25(OH)D3 SERPL-MCNC: 25.1 NG/ML
ALBUMIN SERPL-MCNC: 4.4 G/DL (ref 3.4–5)
ALBUMIN/GLOB SERPL: 1.4 {RATIO} (ref 1.1–2.2)
ALP SERPL-CCNC: 46 U/L (ref 40–129)
ALT SERPL-CCNC: 9 U/L (ref 10–40)
ANION GAP SERPL CALCULATED.3IONS-SCNC: 16 MMOL/L (ref 3–16)
AST SERPL-CCNC: 12 U/L (ref 15–37)
BILIRUB SERPL-MCNC: 0.3 MG/DL (ref 0–1)
BUN SERPL-MCNC: 24 MG/DL (ref 7–20)
CALCIUM SERPL-MCNC: 9.9 MG/DL (ref 8.3–10.6)
CHLORIDE SERPL-SCNC: 104 MMOL/L (ref 99–110)
CHOLEST SERPL-MCNC: 164 MG/DL (ref 0–199)
CO2 SERPL-SCNC: 23 MMOL/L (ref 21–32)
CREAT SERPL-MCNC: 0.9 MG/DL (ref 0.6–1.2)
EST. AVERAGE GLUCOSE BLD GHB EST-MCNC: 125.5 MG/DL
GFR SERPLBLD CREATININE-BSD FMLA CKD-EPI: 69 ML/MIN/{1.73_M2}
GLUCOSE SERPL-MCNC: 111 MG/DL (ref 70–99)
HBA1C MFR BLD: 6 %
HDLC SERPL-MCNC: 55 MG/DL (ref 40–60)
LDLC SERPL CALC-MCNC: 88 MG/DL
POTASSIUM SERPL-SCNC: 4.2 MMOL/L (ref 3.5–5.1)
PROT SERPL-MCNC: 7.5 G/DL (ref 6.4–8.2)
PTH-INTACT SERPL-MCNC: 74.6 PG/ML (ref 14–72)
SODIUM SERPL-SCNC: 143 MMOL/L (ref 136–145)
TRIGL SERPL-MCNC: 105 MG/DL (ref 0–150)
VLDLC SERPL CALC-MCNC: 21 MG/DL

## 2024-06-05 ENCOUNTER — OFFICE VISIT (OUTPATIENT)
Dept: FAMILY MEDICINE CLINIC | Age: 69
End: 2024-06-05
Payer: MEDICARE

## 2024-06-05 VITALS
OXYGEN SATURATION: 98 % | SYSTOLIC BLOOD PRESSURE: 130 MMHG | HEIGHT: 62 IN | HEART RATE: 72 BPM | BODY MASS INDEX: 51.16 KG/M2 | DIASTOLIC BLOOD PRESSURE: 78 MMHG | WEIGHT: 278 LBS

## 2024-06-05 DIAGNOSIS — I48.0 PAROXYSMAL ATRIAL FIBRILLATION (HCC): ICD-10-CM

## 2024-06-05 DIAGNOSIS — J44.9 CHRONIC OBSTRUCTIVE PULMONARY DISEASE, UNSPECIFIED COPD TYPE (HCC): ICD-10-CM

## 2024-06-05 DIAGNOSIS — Z00.00 MEDICARE ANNUAL WELLNESS VISIT, SUBSEQUENT: Primary | ICD-10-CM

## 2024-06-05 DIAGNOSIS — J96.12 CHRONIC RESPIRATORY FAILURE WITH HYPERCAPNIA (HCC): ICD-10-CM

## 2024-06-05 DIAGNOSIS — E21.3 HYPERPARATHYROIDISM (HCC): ICD-10-CM

## 2024-06-05 PROCEDURE — 3078F DIAST BP <80 MM HG: CPT | Performed by: FAMILY MEDICINE

## 2024-06-05 PROCEDURE — G0439 PPPS, SUBSEQ VISIT: HCPCS | Performed by: FAMILY MEDICINE

## 2024-06-05 PROCEDURE — 3017F COLORECTAL CA SCREEN DOC REV: CPT | Performed by: FAMILY MEDICINE

## 2024-06-05 PROCEDURE — 3075F SYST BP GE 130 - 139MM HG: CPT | Performed by: FAMILY MEDICINE

## 2024-06-05 PROCEDURE — 1123F ACP DISCUSS/DSCN MKR DOCD: CPT | Performed by: FAMILY MEDICINE

## 2024-06-05 ASSESSMENT — PATIENT HEALTH QUESTIONNAIRE - PHQ9
1. LITTLE INTEREST OR PLEASURE IN DOING THINGS: NOT AT ALL
SUM OF ALL RESPONSES TO PHQ9 QUESTIONS 1 & 2: 0
SUM OF ALL RESPONSES TO PHQ QUESTIONS 1-9: 0
2. FEELING DOWN, DEPRESSED OR HOPELESS: NOT AT ALL
SUM OF ALL RESPONSES TO PHQ QUESTIONS 1-9: 0

## 2024-06-05 NOTE — PATIENT INSTRUCTIONS

## 2024-06-05 NOTE — PROGRESS NOTES
MD as PCP - General (Family Medicine)  Moshe Mcghee MD as PCP - Empaneled Provider  Kenia Santos DO (Family Medicine)  Kenia Santos DO (Family Medicine)  Kenia Santos DO (Family Medicine)     Reviewed and updated this visit:  Tobacco  Allergies  Meds  Med Hx  Surg Hx  Soc Hx  Fam Hx

## 2024-06-18 ENCOUNTER — COMMUNITY OUTREACH (OUTPATIENT)
Dept: FAMILY MEDICINE CLINIC | Age: 69
End: 2024-06-18

## 2025-01-20 RX ORDER — SIMVASTATIN 20 MG
TABLET ORAL
Qty: 100 TABLET | Refills: 2 | Status: SHIPPED | OUTPATIENT
Start: 2025-01-20

## 2025-01-20 RX ORDER — SPIRONOLACTONE 25 MG/1
25 TABLET ORAL DAILY
Qty: 100 TABLET | Refills: 2 | Status: SHIPPED | OUTPATIENT
Start: 2025-01-20

## 2025-01-20 RX ORDER — DILTIAZEM HYDROCHLORIDE 120 MG/1
CAPSULE, COATED, EXTENDED RELEASE ORAL
Qty: 100 CAPSULE | Refills: 2 | Status: SHIPPED | OUTPATIENT
Start: 2025-01-20

## 2025-01-20 RX ORDER — LEVOTHYROXINE SODIUM 50 UG/1
50 TABLET ORAL DAILY
Qty: 100 TABLET | Refills: 2 | Status: SHIPPED | OUTPATIENT
Start: 2025-01-20

## 2025-01-20 NOTE — TELEPHONE ENCOUNTER
Medication:   Requested Prescriptions     Pending Prescriptions Disp Refills    dilTIAZem (CARDIZEM CD) 120 MG extended release capsule [Pharmacy Med Name: DilTIAZem CD CAP 120MG/24HR] 100 capsule 2     Sig: TAKE 1 CAPSULE BY MOUTH DAILY  AND DO NOT CRUSH OR BREAK    simvastatin (ZOCOR) 20 MG tablet [Pharmacy Med Name: Simvastatin 20 MG Oral Tablet] 100 tablet 2     Sig: TAKE 1 TABLET BY MOUTH AT NIGHT    spironolactone (ALDACTONE) 25 MG tablet [Pharmacy Med Name: Spironolactone 25 MG Oral Tablet] 100 tablet 2     Sig: TAKE 1 TABLET BY MOUTH DAILY    levothyroxine (SYNTHROID) 50 MCG tablet [Pharmacy Med Name: Levothyroxine Sodium 50 MCG Oral Tablet] 100 tablet 2     Sig: TAKE 1 TABLET BY MOUTH DAILY       Last Filled:  11/29/2024     Patient Phone Number: [phone] (home)     Last appt: 6/5/2024   Next appt: Visit date not found    Last Lipid:   Lab Results   Component Value Date/Time    CHOL 164 05/29/2024 12:53 PM    TRIG 105 05/29/2024 12:53 PM    HDL 55 05/29/2024 12:53 PM

## 2025-01-27 ENCOUNTER — APPOINTMENT (OUTPATIENT)
Age: 70
DRG: 493 | End: 2025-01-27
Payer: MEDICARE

## 2025-01-27 ENCOUNTER — HOSPITAL ENCOUNTER (INPATIENT)
Age: 70
LOS: 3 days | Discharge: NURSING, REHAB OR LONG-TERM CARE | DRG: 493 | End: 2025-01-30
Attending: EMERGENCY MEDICINE | Admitting: STUDENT IN AN ORGANIZED HEALTH CARE EDUCATION/TRAINING PROGRAM
Payer: MEDICARE

## 2025-01-27 DIAGNOSIS — S42.351A CLOSED DISPLACED COMMINUTED FRACTURE OF SHAFT OF RIGHT HUMERUS, INITIAL ENCOUNTER: ICD-10-CM
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
## 2025-01-27 PROBLEM — S42.344A: Status: ACTIVE | Noted: 2025-01-27

## 2025-01-27 LAB
ANION GAP SERPL CALCULATED.3IONS-SCNC: 10 MMOL/L (ref 3–16)
BASOPHILS # BLD: 0.02 K/UL (ref 0–0.2)
BASOPHILS NFR BLD: 0 %
BUN SERPL-MCNC: 21 MG/DL (ref 7–20)
CALCIUM SERPL-MCNC: 8.7 MG/DL (ref 8.3–10.6)
CHLORIDE SERPL-SCNC: 105 MMOL/L (ref 99–110)
CO2 SERPL-SCNC: 24 MMOL/L (ref 21–32)
CREAT SERPL-MCNC: 0.7 MG/DL (ref 0.6–1.2)
EOSINOPHIL # BLD: 0.04 K/UL (ref 0–0.6)
EOSINOPHILS RELATIVE PERCENT: 0 %
ERYTHROCYTE [DISTWIDTH] IN BLOOD BY AUTOMATED COUNT: 13.7 % (ref 12.4–15.4)
GFR, ESTIMATED: >90 ML/MIN/1.73M2
GLUCOSE SERPL-MCNC: 132 MG/DL (ref 70–99)
HCT VFR BLD AUTO: 31.9 % (ref 36–48)
HGB BLD-MCNC: 10.4 G/DL (ref 12–16)
IMM GRANULOCYTES # BLD AUTO: 0.03 K/UL (ref 0–0.5)
IMM GRANULOCYTES NFR BLD: 0 %
LYMPHOCYTES NFR BLD: 1.68 K/UL (ref 1–5.1)
LYMPHOCYTES RELATIVE PERCENT: 17 %
MCH RBC QN AUTO: 29.4 PG (ref 26–34)
MCHC RBC AUTO-ENTMCNC: 32.6 G/DL (ref 31–36)
MCV RBC AUTO: 90.1 FL (ref 80–100)
MONOCYTES NFR BLD: 0.82 K/UL (ref 0–1.3)
MONOCYTES NFR BLD: 9 %
NEUTROPHILS NFR BLD: 73 %
NEUTS SEG NFR BLD: 7.09 K/UL (ref 1.7–7.7)
PLATELET # BLD AUTO: 181 K/UL (ref 135–450)
PMV BLD AUTO: 11.6 FL (ref 9.4–12.4)
POTASSIUM SERPL-SCNC: 4.4 MMOL/L (ref 3.5–5.1)
RBC # BLD AUTO: 3.54 M/UL (ref 4–5.2)
SODIUM SERPL-SCNC: 140 MMOL/L (ref 136–145)
WBC OTHER # BLD: 9.7 K/UL (ref 4–11)

## 2025-01-27 PROCEDURE — 80048 BASIC METABOLIC PNL TOTAL CA: CPT

## 2025-01-27 PROCEDURE — 36415 COLL VENOUS BLD VENIPUNCTURE: CPT

## 2025-01-27 PROCEDURE — 99285 EMERGENCY DEPT VISIT HI MDM: CPT | Performed by: ORTHOPAEDIC SURGERY

## 2025-01-27 PROCEDURE — 2500000003 HC RX 250 WO HCPCS: Performed by: STUDENT IN AN ORGANIZED HEALTH CARE EDUCATION/TRAINING PROGRAM

## 2025-01-27 PROCEDURE — 73200 CT UPPER EXTREMITY W/O DYE: CPT

## 2025-01-27 PROCEDURE — 96376 TX/PRO/DX INJ SAME DRUG ADON: CPT

## 2025-01-27 PROCEDURE — 85025 COMPLETE CBC W/AUTO DIFF WBC: CPT

## 2025-01-27 PROCEDURE — 73060 X-RAY EXAM OF HUMERUS: CPT

## 2025-01-27 PROCEDURE — 6360000002 HC RX W HCPCS: Performed by: EMERGENCY MEDICINE

## 2025-01-27 PROCEDURE — 73030 X-RAY EXAM OF SHOULDER: CPT

## 2025-01-27 PROCEDURE — 6370000000 HC RX 637 (ALT 250 FOR IP): Performed by: STUDENT IN AN ORGANIZED HEALTH CARE EDUCATION/TRAINING PROGRAM

## 2025-01-27 PROCEDURE — 96374 THER/PROPH/DIAG INJ IV PUSH: CPT

## 2025-01-27 PROCEDURE — 99285 EMERGENCY DEPT VISIT HI MDM: CPT

## 2025-01-27 PROCEDURE — 2060000000 HC ICU INTERMEDIATE R&B

## 2025-01-27 RX ORDER — DEXTROSE MONOHYDRATE 100 MG/ML
INJECTION, SOLUTION INTRAVENOUS CONTINUOUS PRN
Status: DISCONTINUED | OUTPATIENT
Start: 2025-01-27 | End: 2025-01-30 | Stop reason: HOSPADM

## 2025-01-27 RX ORDER — INSULIN LISPRO 100 [IU]/ML
0-4 INJECTION, SOLUTION INTRAVENOUS; SUBCUTANEOUS
Status: DISCONTINUED | OUTPATIENT
Start: 2025-01-27 | End: 2025-01-30 | Stop reason: HOSPADM

## 2025-01-27 RX ORDER — POLYETHYLENE GLYCOL 3350 17 G/17G
17 POWDER, FOR SOLUTION ORAL DAILY PRN
Status: DISCONTINUED | OUTPATIENT
Start: 2025-01-27 | End: 2025-01-28

## 2025-01-27 RX ORDER — POTASSIUM CHLORIDE 7.45 MG/ML
10 INJECTION INTRAVENOUS PRN
Status: DISCONTINUED | OUTPATIENT
Start: 2025-01-27 | End: 2025-01-30 | Stop reason: HOSPADM

## 2025-01-27 RX ORDER — POTASSIUM CHLORIDE 1500 MG/1
40 TABLET, EXTENDED RELEASE ORAL PRN
Status: DISCONTINUED | OUTPATIENT
Start: 2025-01-27 | End: 2025-01-30 | Stop reason: HOSPADM

## 2025-01-27 RX ORDER — ENOXAPARIN SODIUM 100 MG/ML
40 INJECTION SUBCUTANEOUS 2 TIMES DAILY
Status: DISCONTINUED | OUTPATIENT
Start: 2025-01-27 | End: 2025-01-27

## 2025-01-27 RX ORDER — ACETAMINOPHEN 325 MG/1
650 TABLET ORAL EVERY 6 HOURS PRN
Status: DISCONTINUED | OUTPATIENT
Start: 2025-01-27 | End: 2025-01-30 | Stop reason: HOSPADM

## 2025-01-27 RX ORDER — SODIUM CHLORIDE 0.9 % (FLUSH) 0.9 %
5-40 SYRINGE (ML) INJECTION PRN
Status: DISCONTINUED | OUTPATIENT
Start: 2025-01-27 | End: 2025-01-28 | Stop reason: SDUPTHER

## 2025-01-27 RX ORDER — FENTANYL CITRATE 50 UG/ML
50 INJECTION, SOLUTION INTRAMUSCULAR; INTRAVENOUS ONCE
Status: COMPLETED | OUTPATIENT
Start: 2025-01-27 | End: 2025-01-27

## 2025-01-27 RX ORDER — ONDANSETRON 4 MG/1
4 TABLET, ORALLY DISINTEGRATING ORAL EVERY 8 HOURS PRN
Status: DISCONTINUED | OUTPATIENT
Start: 2025-01-27 | End: 2025-01-30 | Stop reason: HOSPADM

## 2025-01-27 RX ORDER — GLUCAGON 1 MG/ML
1 KIT INJECTION PRN
Status: DISCONTINUED | OUTPATIENT
Start: 2025-01-27 | End: 2025-01-30 | Stop reason: HOSPADM

## 2025-01-27 RX ORDER — MAGNESIUM SULFATE IN WATER 40 MG/ML
2000 INJECTION, SOLUTION INTRAVENOUS PRN
Status: DISCONTINUED | OUTPATIENT
Start: 2025-01-27 | End: 2025-01-30 | Stop reason: HOSPADM

## 2025-01-27 RX ORDER — ONDANSETRON 2 MG/ML
4 INJECTION INTRAMUSCULAR; INTRAVENOUS EVERY 6 HOURS PRN
Status: DISCONTINUED | OUTPATIENT
Start: 2025-01-27 | End: 2025-01-30 | Stop reason: HOSPADM

## 2025-01-27 RX ORDER — SODIUM CHLORIDE 9 MG/ML
INJECTION, SOLUTION INTRAVENOUS PRN
Status: DISCONTINUED | OUTPATIENT
Start: 2025-01-27 | End: 2025-01-30 | Stop reason: HOSPADM

## 2025-01-27 RX ORDER — SODIUM CHLORIDE 0.9 % (FLUSH) 0.9 %
5-40 SYRINGE (ML) INJECTION EVERY 12 HOURS SCHEDULED
Status: DISCONTINUED | OUTPATIENT
Start: 2025-01-27 | End: 2025-01-28 | Stop reason: SDUPTHER

## 2025-01-27 RX ORDER — OXYCODONE HYDROCHLORIDE 5 MG/1
5 TABLET ORAL EVERY 4 HOURS PRN
Status: DISCONTINUED | OUTPATIENT
Start: 2025-01-27 | End: 2025-01-28 | Stop reason: SDUPTHER

## 2025-01-27 RX ORDER — OXYCODONE HYDROCHLORIDE 5 MG/1
10 TABLET ORAL EVERY 4 HOURS PRN
Status: DISCONTINUED | OUTPATIENT
Start: 2025-01-27 | End: 2025-01-28 | Stop reason: SDUPTHER

## 2025-01-27 RX ORDER — ACETAMINOPHEN 650 MG/1
650 SUPPOSITORY RECTAL EVERY 6 HOURS PRN
Status: DISCONTINUED | OUTPATIENT
Start: 2025-01-27 | End: 2025-01-30 | Stop reason: HOSPADM

## 2025-01-27 RX ORDER — FENTANYL CITRATE 50 UG/ML
100 INJECTION, SOLUTION INTRAMUSCULAR; INTRAVENOUS ONCE
Status: COMPLETED | OUTPATIENT
Start: 2025-01-27 | End: 2025-01-27

## 2025-01-27 RX ADMIN — FENTANYL CITRATE 50 MCG: 50 INJECTION INTRAMUSCULAR; INTRAVENOUS at 15:48

## 2025-01-27 RX ADMIN — Medication 10 ML: at 21:33

## 2025-01-27 RX ADMIN — OXYCODONE 10 MG: 5 TABLET ORAL at 21:33

## 2025-01-27 RX ADMIN — FENTANYL CITRATE 100 MCG: 50 INJECTION INTRAMUSCULAR; INTRAVENOUS at 17:36

## 2025-01-27 ASSESSMENT — PAIN - FUNCTIONAL ASSESSMENT
PAIN_FUNCTIONAL_ASSESSMENT: PREVENTS OR INTERFERES WITH ALL ACTIVE AND SOME PASSIVE ACTIVITIES
PAIN_FUNCTIONAL_ASSESSMENT: 0-10
PAIN_FUNCTIONAL_ASSESSMENT: PREVENTS OR INTERFERES WITH ALL ACTIVE AND SOME PASSIVE ACTIVITIES

## 2025-01-27 ASSESSMENT — LIFESTYLE VARIABLES
HOW MANY STANDARD DRINKS CONTAINING ALCOHOL DO YOU HAVE ON A TYPICAL DAY: PATIENT DOES NOT DRINK
HOW OFTEN DO YOU HAVE A DRINK CONTAINING ALCOHOL: NEVER

## 2025-01-27 ASSESSMENT — PAIN DESCRIPTION - DIRECTION: RADIATING_TOWARDS: ARM

## 2025-01-27 ASSESSMENT — PAIN DESCRIPTION - LOCATION
LOCATION: ARM
LOCATION: ELBOW;ARM;SHOULDER
LOCATION: ELBOW
LOCATION: ARM

## 2025-01-27 ASSESSMENT — PAIN DESCRIPTION - DESCRIPTORS
DESCRIPTORS: SHARP
DESCRIPTORS: SHARP
DESCRIPTORS: ACHING;SHARP
DESCRIPTORS: SHARP;SORE

## 2025-01-27 ASSESSMENT — PAIN DESCRIPTION - FREQUENCY
FREQUENCY: CONTINUOUS
FREQUENCY: CONTINUOUS

## 2025-01-27 ASSESSMENT — PAIN DESCRIPTION - ORIENTATION
ORIENTATION: RIGHT

## 2025-01-27 ASSESSMENT — PAIN SCALES - GENERAL
PAINLEVEL_OUTOF10: 4
PAINLEVEL_OUTOF10: 10
PAINLEVEL_OUTOF10: 9

## 2025-01-27 ASSESSMENT — PAIN DESCRIPTION - ONSET
ONSET: ON-GOING
ONSET: ON-GOING

## 2025-01-27 ASSESSMENT — PAIN DESCRIPTION - PAIN TYPE
TYPE: ACUTE PAIN
TYPE: ACUTE PAIN

## 2025-01-27 NOTE — H&P
citalopram (CELEXA) 20 MG tablet TAKE 1 TABLET BY MOUTH DAILY 11/19/19   Moshe Mcghee MD   pantoprazole (PROTONIX) 40 MG tablet TAKE 1 TABLET BY MOUTH DAILY 11/19/19   Moshe Mcghee MD   metoprolol tartrate (LOPRESSOR) 50 MG tablet TAKE 1 TABLET BY MOUTH TWICE DAILY 11/19/19   Moshe Mcghee MD   nystatin (MYCOSTATIN) 633503 UNIT/GM cream Apply topically 2 times daily. 9/27/19   Moshe Mcghee MD   metaxalone (SKELAXIN) 800 MG tablet Take 1 tablet by mouth 3 times daily    Karely Camara MD   polyethylene glycol (GLYCOLAX) packet Take 1 packet by mouth daily as needed 7/24/19   Karely Camara MD   traMADol (ULTRAM) 50 MG tablet Take 1 tablet by mouth every 6 hours as needed for Pain.    Karely Camara MD   metFORMIN (GLUCOPHAGE) 500 MG tablet TAKE 1 TABLET BY MOUTH TWICE DAILY WITH FOOD 5/29/19   Moshe Mcghee MD   Multiple Vitamins-Minerals (THERAPEUTIC MULTIVITAMIN-MINERALS) tablet Take 1 tablet by mouth daily    Karely Camara MD       Labs: Personally reviewed and interpreted for clinical significance.   No results for input(s): \"WBC\", \"HGB\", \"HCT\", \"PLT\" in the last 72 hours.  No results for input(s): \"NA\", \"K\", \"CL\", \"CO2\", \"BUN\", \"CREATININE\", \"CALCIUM\", \"MG\", \"PHOS\" in the last 72 hours.  No results for input(s): \"PROBNP\", \"TROPHS\" in the last 72 hours.  No results for input(s): \"LABA1C\" in the last 72 hours.  No results for input(s): \"AST\", \"ALT\", \"BILIDIR\", \"BILITOT\", \"ALKPHOS\" in the last 72 hours.  No results for input(s): \"INR\", \"LACTA\", \"TSH\" in the last 72 hours.     Joel Warner MD

## 2025-01-27 NOTE — ED TRIAGE NOTES
Pt reports she was adjusting her position in her wheelchair and slipped out of the wheelchair. She attempted to grab at something to keep her in her wheelchair and wrenched her R arm. Now experiencing 10/10 elbow and upper arm pain. Pt reports taking eliquis and denies striking head or LOC at time of fall.

## 2025-01-27 NOTE — ED PROVIDER NOTES
TABLET BY MOUTH AT NIGHT 100 tablet 2    spironolactone (ALDACTONE) 25 MG tablet TAKE 1 TABLET BY MOUTH DAILY 100 tablet 2    levothyroxine (SYNTHROID) 50 MCG tablet TAKE 1 TABLET BY MOUTH DAILY 100 tablet 2    torsemide (DEMADEX) 10 MG tablet TAKE 2 TABLETS BY MOUTH DAILY 200 tablet 2    zinc gluconate 50 MG tablet Take 1 tablet by mouth daily      Potassium 99 MG TABS Take 1 tablet by mouth daily      Cholecalciferol (VITAMIN D) 10 MCG (400 UNIT) CAPS Capsule Take 25 mcg by mouth      Ascorbic Acid 500 MG CHEW 500 mg daily      TURMERIC PO Take by mouth daily      Magnesium 500 MG TABS Take 30 mg by mouth daily      ELIQUIS 5 MG TABS tablet TAKE 1 TABLET BY MOUTH  TWICE DAILY 180 tablet 3    Misc. Devices (WHEELCHAIR) MISC Use daily for mobility. 1 each 0    fluticasone (FLONASE) 50 MCG/ACT nasal spray 2 sprays by Nasal route daily 1 Bottle 3    prazosin (MINIPRESS) 1 MG capsule TAKE 1 CAPSULE BY MOUTH EVERY NIGHT 90 capsule 3    EFFER-K 20 MEQ TBEF effervescent tablet DISSOLVE AND DRINK 1 TABLET BY MOUTH TWICE DAILY WITH MEALS 180 tablet 3    citalopram (CELEXA) 20 MG tablet TAKE 1 TABLET BY MOUTH DAILY 90 tablet 3    pantoprazole (PROTONIX) 40 MG tablet TAKE 1 TABLET BY MOUTH DAILY 90 tablet 3    metoprolol tartrate (LOPRESSOR) 50 MG tablet TAKE 1 TABLET BY MOUTH TWICE DAILY 180 tablet 3    nystatin (MYCOSTATIN) 602343 UNIT/GM cream Apply topically 2 times daily. 30 g 3    metaxalone (SKELAXIN) 800 MG tablet Take 1 tablet by mouth 3 times daily      polyethylene glycol (GLYCOLAX) packet Take 1 packet by mouth daily as needed      traMADol (ULTRAM) 50 MG tablet Take 1 tablet by mouth every 6 hours as needed for Pain.      metFORMIN (GLUCOPHAGE) 500 MG tablet TAKE 1 TABLET BY MOUTH TWICE DAILY WITH FOOD 660 tablet 0    Multiple Vitamins-Minerals (THERAPEUTIC MULTIVITAMIN-MINERALS) tablet Take 1 tablet by mouth daily       Allergies   Allergen Reactions    Latex Hives and Shortness Of Breath    Adhesive Tape Hives

## 2025-01-27 NOTE — CONSULTS
[facility] Orthopedic Surgery  Consult Note         This patient is seen in consultation at the request of Joel Matthew MD     Reason for Consult:  Right arm pain/ moderately displaced humerus shaft and supracondylar fracture.    CHIEF COMPLAINT:  Right arm pain/ moderately displaced humerus shaft and supracondylar fracture.    History Obtained From:  patient, electronic medical record    HISTORY OF PRESENT ILLNESS:    Ms. Dacosta is a 69 y.o.  female right handed who seen today at  ED for evaluation of a right arm injury.  The patient reports that this injury occurred when she fell off her wheelchair.  She was first seen and evaluated in [facility] came via EMS, when she was x-rayed with Orthopedic consultation. The patient denies any other injuries.  Movement makes the pain worse, resting makes the pain better. No numbness or tingling sensation.      Past Medical History:        Diagnosis Date    Hyperlipidemia     Hypertension     Type II or unspecified type diabetes mellitus without mention of complication, not stated as uncontrolled        Past Surgical History:        Procedure Laterality Date    HYSTERECTOMY (CERVIX STATUS UNKNOWN)      TONSILLECTOMY AND ADENOIDECTOMY  1960       Medications prior to admission:   Prior to Admission medications    Medication Sig Start Date End Date Taking? Authorizing Provider   dilTIAZem (CARDIZEM CD) 120 MG extended release capsule TAKE 1 CAPSULE BY MOUTH DAILY  AND DO NOT CRUSH OR BREAK 1/20/25   Moshe Mcghee MD   simvastatin (ZOCOR) 20 MG tablet TAKE 1 TABLET BY MOUTH AT NIGHT 1/20/25   Moshe Mcghee MD   spironolactone (ALDACTONE) 25 MG tablet TAKE 1 TABLET BY MOUTH DAILY 1/20/25   Moshe Mcghee MD   levothyroxine (SYNTHROID) 50 MCG tablet TAKE 1 TABLET BY MOUTH DAILY 1/20/25   Moshe Mcghee MD   torsemide (DEMADEX) 10 MG tablet TAKE 2 TABLETS BY MOUTH DAILY 4/29/24   Moshe Mcghee MD   zinc gluconate 50 MG tablet Take 1 tablet by

## 2025-01-28 ENCOUNTER — APPOINTMENT (OUTPATIENT)
Age: 70
DRG: 493 | End: 2025-01-28
Payer: MEDICARE

## 2025-01-28 ENCOUNTER — ANESTHESIA (OUTPATIENT)
Age: 70
DRG: 493 | End: 2025-01-28
Payer: MEDICARE

## 2025-01-28 ENCOUNTER — ANESTHESIA EVENT (OUTPATIENT)
Age: 70
DRG: 493 | End: 2025-01-28
Payer: MEDICARE

## 2025-01-28 LAB
ABO + RH BLD: NORMAL
ANION GAP SERPL CALCULATED.3IONS-SCNC: 7 MMOL/L (ref 3–16)
BASOPHILS # BLD: 0.02 K/UL (ref 0–0.2)
BASOPHILS NFR BLD: 0 %
BLOOD BANK SAMPLE EXPIRATION: NORMAL
BLOOD GROUP ANTIBODIES SERPL: NEGATIVE
BUN SERPL-MCNC: 17 MG/DL (ref 7–20)
CALCIUM SERPL-MCNC: 8.4 MG/DL (ref 8.3–10.6)
CHLORIDE SERPL-SCNC: 105 MMOL/L (ref 99–110)
CO2 SERPL-SCNC: 27 MMOL/L (ref 21–32)
CREAT SERPL-MCNC: 0.7 MG/DL (ref 0.6–1.2)
EOSINOPHIL # BLD: 0.06 K/UL (ref 0–0.6)
EOSINOPHILS RELATIVE PERCENT: 1 %
ERYTHROCYTE [DISTWIDTH] IN BLOOD BY AUTOMATED COUNT: 13.8 % (ref 12.4–15.4)
GFR, ESTIMATED: >90 ML/MIN/1.73M2
GLUCOSE BLD-MCNC: 125 MG/DL (ref 70–99)
GLUCOSE BLD-MCNC: 129 MG/DL (ref 70–99)
GLUCOSE BLD-MCNC: 135 MG/DL (ref 70–99)
GLUCOSE BLD-MCNC: 193 MG/DL (ref 70–99)
GLUCOSE SERPL-MCNC: 120 MG/DL (ref 70–99)
HCT VFR BLD AUTO: 28.9 % (ref 36–48)
HGB BLD-MCNC: 9.3 G/DL (ref 12–16)
IMM GRANULOCYTES # BLD AUTO: 0.02 K/UL (ref 0–0.5)
IMM GRANULOCYTES NFR BLD: 0 %
INR PPP: 1.2 (ref 0.9–1.2)
LYMPHOCYTES NFR BLD: 1.54 K/UL (ref 1–5.1)
LYMPHOCYTES RELATIVE PERCENT: 24 %
MAGNESIUM SERPL-MCNC: 1.7 MG/DL (ref 1.8–2.4)
MCH RBC QN AUTO: 29.2 PG (ref 26–34)
MCHC RBC AUTO-ENTMCNC: 32.2 G/DL (ref 31–36)
MCV RBC AUTO: 90.6 FL (ref 80–100)
MONOCYTES NFR BLD: 0.63 K/UL (ref 0–1.3)
MONOCYTES NFR BLD: 10 %
NEUTROPHILS NFR BLD: 65 %
NEUTS SEG NFR BLD: 4.22 K/UL (ref 1.7–7.7)
PLATELET # BLD AUTO: 165 K/UL (ref 135–450)
PMV BLD AUTO: 12 FL (ref 9.4–12.4)
POTASSIUM SERPL-SCNC: 4.4 MMOL/L (ref 3.5–5.1)
PROTHROMBIN TIME: 15.2 SEC (ref 11.9–14.9)
RBC # BLD AUTO: 3.19 M/UL (ref 4–5.2)
SODIUM SERPL-SCNC: 139 MMOL/L (ref 136–145)
WBC OTHER # BLD: 6.5 K/UL (ref 4–11)

## 2025-01-28 PROCEDURE — 80048 BASIC METABOLIC PNL TOTAL CA: CPT

## 2025-01-28 PROCEDURE — 2580000003 HC RX 258: Performed by: ANESTHESIOLOGY

## 2025-01-28 PROCEDURE — 2720000010 HC SURG SUPPLY STERILE: Performed by: ORTHOPAEDIC SURGERY

## 2025-01-28 PROCEDURE — 0PSF04Z REPOSITION RIGHT HUMERAL SHAFT WITH INTERNAL FIXATION DEVICE, OPEN APPROACH: ICD-10-PCS | Performed by: ORTHOPAEDIC SURGERY

## 2025-01-28 PROCEDURE — 7100000001 HC PACU RECOVERY - ADDTL 15 MIN: Performed by: ORTHOPAEDIC SURGERY

## 2025-01-28 PROCEDURE — 83036 HEMOGLOBIN GLYCOSYLATED A1C: CPT

## 2025-01-28 PROCEDURE — 7100000000 HC PACU RECOVERY - FIRST 15 MIN: Performed by: ORTHOPAEDIC SURGERY

## 2025-01-28 PROCEDURE — 2500000003 HC RX 250 WO HCPCS: Performed by: ANESTHESIOLOGY

## 2025-01-28 PROCEDURE — 86850 RBC ANTIBODY SCREEN: CPT

## 2025-01-28 PROCEDURE — 6360000002 HC RX W HCPCS: Performed by: ORTHOPAEDIC SURGERY

## 2025-01-28 PROCEDURE — 2580000003 HC RX 258: Performed by: ORTHOPAEDIC SURGERY

## 2025-01-28 PROCEDURE — 2709999900 HC NON-CHARGEABLE SUPPLY: Performed by: ORTHOPAEDIC SURGERY

## 2025-01-28 PROCEDURE — 6360000002 HC RX W HCPCS: Performed by: ANESTHESIOLOGY

## 2025-01-28 PROCEDURE — 6370000000 HC RX 637 (ALT 250 FOR IP): Performed by: HOSPITALIST

## 2025-01-28 PROCEDURE — 6370000000 HC RX 637 (ALT 250 FOR IP): Performed by: ORTHOPAEDIC SURGERY

## 2025-01-28 PROCEDURE — 3700000000 HC ANESTHESIA ATTENDED CARE: Performed by: ORTHOPAEDIC SURGERY

## 2025-01-28 PROCEDURE — 6370000000 HC RX 637 (ALT 250 FOR IP): Performed by: STUDENT IN AN ORGANIZED HEALTH CARE EDUCATION/TRAINING PROGRAM

## 2025-01-28 PROCEDURE — 3700000001 HC ADD 15 MINUTES (ANESTHESIA): Performed by: ORTHOPAEDIC SURGERY

## 2025-01-28 PROCEDURE — C1713 ANCHOR/SCREW BN/BN,TIS/BN: HCPCS | Performed by: ORTHOPAEDIC SURGERY

## 2025-01-28 PROCEDURE — 3600000004 HC SURGERY LEVEL 4 BASE: Performed by: ORTHOPAEDIC SURGERY

## 2025-01-28 PROCEDURE — 83735 ASSAY OF MAGNESIUM: CPT

## 2025-01-28 PROCEDURE — 6360000002 HC RX W HCPCS: Performed by: STUDENT IN AN ORGANIZED HEALTH CARE EDUCATION/TRAINING PROGRAM

## 2025-01-28 PROCEDURE — 82962 GLUCOSE BLOOD TEST: CPT

## 2025-01-28 PROCEDURE — 86900 BLOOD TYPING SEROLOGIC ABO: CPT

## 2025-01-28 PROCEDURE — 86901 BLOOD TYPING SEROLOGIC RH(D): CPT

## 2025-01-28 PROCEDURE — 85025 COMPLETE CBC W/AUTO DIFF WBC: CPT

## 2025-01-28 PROCEDURE — 85610 PROTHROMBIN TIME: CPT

## 2025-01-28 PROCEDURE — 2060000000 HC ICU INTERMEDIATE R&B

## 2025-01-28 PROCEDURE — 2500000003 HC RX 250 WO HCPCS: Performed by: ORTHOPAEDIC SURGERY

## 2025-01-28 PROCEDURE — 2500000003 HC RX 250 WO HCPCS: Performed by: STUDENT IN AN ORGANIZED HEALTH CARE EDUCATION/TRAINING PROGRAM

## 2025-01-28 PROCEDURE — 3600000014 HC SURGERY LEVEL 4 ADDTL 15MIN: Performed by: ORTHOPAEDIC SURGERY

## 2025-01-28 PROCEDURE — 36415 COLL VENOUS BLD VENIPUNCTURE: CPT

## 2025-01-28 DEVICE — LOCKING SCREW
Type: IMPLANTABLE DEVICE | Site: HUMERUS | Status: FUNCTIONAL
Brand: VARIAX

## 2025-01-28 DEVICE — BONE SCREW, FULLY THREADED,T10
Type: IMPLANTABLE DEVICE | Site: HUMERUS | Status: FUNCTIONAL
Brand: VARIAX

## 2025-01-28 DEVICE — DISTAL MEDIAL HUMERUS PLATE
Type: IMPLANTABLE DEVICE | Site: HUMERUS | Status: FUNCTIONAL
Brand: VARIAX

## 2025-01-28 DEVICE — CORTEX SCREW
Type: IMPLANTABLE DEVICE | Site: HUMERUS | Status: FUNCTIONAL
Brand: AXSOS

## 2025-01-28 DEVICE — BONE SCREW
Type: IMPLANTABLE DEVICE | Site: HUMERUS | Status: FUNCTIONAL
Brand: VARIAX

## 2025-01-28 RX ORDER — TRAMADOL HYDROCHLORIDE 50 MG/1
50 TABLET ORAL EVERY 6 HOURS PRN
Status: DISCONTINUED | OUTPATIENT
Start: 2025-01-28 | End: 2025-01-28 | Stop reason: ALTCHOICE

## 2025-01-28 RX ORDER — SODIUM CHLORIDE 0.9 % (FLUSH) 0.9 %
5-40 SYRINGE (ML) INJECTION EVERY 12 HOURS SCHEDULED
Status: DISCONTINUED | OUTPATIENT
Start: 2025-01-28 | End: 2025-01-28 | Stop reason: HOSPADM

## 2025-01-28 RX ORDER — ONDANSETRON 2 MG/ML
4 INJECTION INTRAMUSCULAR; INTRAVENOUS
Status: DISCONTINUED | OUTPATIENT
Start: 2025-01-28 | End: 2025-01-28 | Stop reason: HOSPADM

## 2025-01-28 RX ORDER — PANTOPRAZOLE SODIUM 40 MG/1
40 TABLET, DELAYED RELEASE ORAL DAILY
Status: DISCONTINUED | OUTPATIENT
Start: 2025-01-28 | End: 2025-01-28 | Stop reason: ALTCHOICE

## 2025-01-28 RX ORDER — DILTIAZEM HYDROCHLORIDE 120 MG/1
120 CAPSULE, COATED, EXTENDED RELEASE ORAL DAILY
Status: DISCONTINUED | OUTPATIENT
Start: 2025-01-28 | End: 2025-01-30 | Stop reason: HOSPADM

## 2025-01-28 RX ORDER — NALOXONE HYDROCHLORIDE 0.4 MG/ML
INJECTION, SOLUTION INTRAMUSCULAR; INTRAVENOUS; SUBCUTANEOUS PRN
Status: DISCONTINUED | OUTPATIENT
Start: 2025-01-28 | End: 2025-01-28 | Stop reason: HOSPADM

## 2025-01-28 RX ORDER — ASPIRIN 325 MG
325 TABLET, DELAYED RELEASE (ENTERIC COATED) ORAL 2 TIMES DAILY
Status: DISCONTINUED | OUTPATIENT
Start: 2025-01-28 | End: 2025-01-30 | Stop reason: HOSPADM

## 2025-01-28 RX ORDER — SUCCINYLCHOLINE/SOD CL,ISO/PF 100 MG/5ML
SYRINGE (ML) INTRAVENOUS
Status: DISCONTINUED | OUTPATIENT
Start: 2025-01-28 | End: 2025-01-28 | Stop reason: SDUPTHER

## 2025-01-28 RX ORDER — OXYCODONE HYDROCHLORIDE 5 MG/1
5 TABLET ORAL EVERY 4 HOURS PRN
Status: DISCONTINUED | OUTPATIENT
Start: 2025-01-28 | End: 2025-01-30 | Stop reason: HOSPADM

## 2025-01-28 RX ORDER — SODIUM CHLORIDE 0.9 % (FLUSH) 0.9 %
5-40 SYRINGE (ML) INJECTION PRN
Status: DISCONTINUED | OUTPATIENT
Start: 2025-01-28 | End: 2025-01-30 | Stop reason: HOSPADM

## 2025-01-28 RX ORDER — ROCURONIUM BROMIDE 10 MG/ML
INJECTION, SOLUTION INTRAVENOUS
Status: DISCONTINUED | OUTPATIENT
Start: 2025-01-28 | End: 2025-01-28 | Stop reason: SDUPTHER

## 2025-01-28 RX ORDER — METOPROLOL TARTRATE 50 MG
50 TABLET ORAL 2 TIMES DAILY
Status: DISCONTINUED | OUTPATIENT
Start: 2025-01-28 | End: 2025-01-28 | Stop reason: ALTCHOICE

## 2025-01-28 RX ORDER — SPIRONOLACTONE 25 MG/1
25 TABLET ORAL DAILY
Status: DISCONTINUED | OUTPATIENT
Start: 2025-01-28 | End: 2025-01-30 | Stop reason: HOSPADM

## 2025-01-28 RX ORDER — SODIUM CHLORIDE 9 MG/ML
INJECTION, SOLUTION INTRAVENOUS PRN
Status: DISCONTINUED | OUTPATIENT
Start: 2025-01-28 | End: 2025-01-30 | Stop reason: HOSPADM

## 2025-01-28 RX ORDER — MEPERIDINE HYDROCHLORIDE 25 MG/ML
12.5 INJECTION INTRAMUSCULAR; INTRAVENOUS; SUBCUTANEOUS EVERY 5 MIN PRN
Status: DISCONTINUED | OUTPATIENT
Start: 2025-01-28 | End: 2025-01-28 | Stop reason: HOSPADM

## 2025-01-28 RX ORDER — DEXAMETHASONE SODIUM PHOSPHATE 4 MG/ML
INJECTION, SOLUTION INTRA-ARTICULAR; INTRALESIONAL; INTRAMUSCULAR; INTRAVENOUS; SOFT TISSUE
Status: DISCONTINUED | OUTPATIENT
Start: 2025-01-28 | End: 2025-01-28 | Stop reason: SDUPTHER

## 2025-01-28 RX ORDER — SODIUM CHLORIDE 0.9 % (FLUSH) 0.9 %
5-40 SYRINGE (ML) INJECTION PRN
Status: DISCONTINUED | OUTPATIENT
Start: 2025-01-28 | End: 2025-01-28 | Stop reason: HOSPADM

## 2025-01-28 RX ORDER — FENTANYL CITRATE 50 UG/ML
25 INJECTION, SOLUTION INTRAMUSCULAR; INTRAVENOUS EVERY 5 MIN PRN
Status: DISCONTINUED | OUTPATIENT
Start: 2025-01-28 | End: 2025-01-28 | Stop reason: HOSPADM

## 2025-01-28 RX ORDER — SODIUM CHLORIDE 9 MG/ML
INJECTION, SOLUTION INTRAVENOUS PRN
Status: DISCONTINUED | OUTPATIENT
Start: 2025-01-28 | End: 2025-01-28 | Stop reason: HOSPADM

## 2025-01-28 RX ORDER — PROPOFOL 10 MG/ML
INJECTION, EMULSION INTRAVENOUS
Status: DISCONTINUED | OUTPATIENT
Start: 2025-01-28 | End: 2025-01-28 | Stop reason: SDUPTHER

## 2025-01-28 RX ORDER — LIDOCAINE HYDROCHLORIDE 20 MG/ML
INJECTION, SOLUTION EPIDURAL; INFILTRATION; INTRACAUDAL; PERINEURAL
Status: DISCONTINUED | OUTPATIENT
Start: 2025-01-28 | End: 2025-01-28 | Stop reason: SDUPTHER

## 2025-01-28 RX ORDER — FENTANYL CITRATE 50 UG/ML
INJECTION, SOLUTION INTRAMUSCULAR; INTRAVENOUS
Status: DISCONTINUED | OUTPATIENT
Start: 2025-01-28 | End: 2025-01-28 | Stop reason: SDUPTHER

## 2025-01-28 RX ORDER — DROPERIDOL 2.5 MG/ML
0.62 INJECTION, SOLUTION INTRAMUSCULAR; INTRAVENOUS
Status: DISCONTINUED | OUTPATIENT
Start: 2025-01-28 | End: 2025-01-28 | Stop reason: HOSPADM

## 2025-01-28 RX ORDER — SODIUM CHLORIDE 0.9 % (FLUSH) 0.9 %
5-40 SYRINGE (ML) INJECTION EVERY 12 HOURS SCHEDULED
Status: DISCONTINUED | OUTPATIENT
Start: 2025-01-28 | End: 2025-01-30 | Stop reason: HOSPADM

## 2025-01-28 RX ORDER — MAGNESIUM HYDROXIDE 1200 MG/15ML
LIQUID ORAL CONTINUOUS PRN
Status: COMPLETED | OUTPATIENT
Start: 2025-01-28 | End: 2025-01-28

## 2025-01-28 RX ORDER — OXYCODONE HYDROCHLORIDE 5 MG/1
10 TABLET ORAL EVERY 4 HOURS PRN
Status: DISCONTINUED | OUTPATIENT
Start: 2025-01-28 | End: 2025-01-30 | Stop reason: HOSPADM

## 2025-01-28 RX ORDER — OXYCODONE HYDROCHLORIDE 5 MG/1
5 TABLET ORAL
Status: DISCONTINUED | OUTPATIENT
Start: 2025-01-28 | End: 2025-01-28 | Stop reason: HOSPADM

## 2025-01-28 RX ORDER — METAXALONE 800 MG/1
800 TABLET ORAL 3 TIMES DAILY
Status: DISCONTINUED | OUTPATIENT
Start: 2025-01-28 | End: 2025-01-28 | Stop reason: ALTCHOICE

## 2025-01-28 RX ORDER — FLUTICASONE PROPIONATE 50 MCG
2 SPRAY, SUSPENSION (ML) NASAL DAILY PRN
Status: DISCONTINUED | OUTPATIENT
Start: 2025-01-28 | End: 2025-01-30 | Stop reason: HOSPADM

## 2025-01-28 RX ORDER — ATORVASTATIN CALCIUM 10 MG/1
10 TABLET, FILM COATED ORAL DAILY
Status: DISCONTINUED | OUTPATIENT
Start: 2025-01-28 | End: 2025-01-30 | Stop reason: HOSPADM

## 2025-01-28 RX ORDER — ONDANSETRON 2 MG/ML
INJECTION INTRAMUSCULAR; INTRAVENOUS
Status: DISCONTINUED | OUTPATIENT
Start: 2025-01-28 | End: 2025-01-28 | Stop reason: SDUPTHER

## 2025-01-28 RX ORDER — TORSEMIDE 20 MG/1
20 TABLET ORAL DAILY
Status: DISCONTINUED | OUTPATIENT
Start: 2025-01-28 | End: 2025-01-28 | Stop reason: ALTCHOICE

## 2025-01-28 RX ORDER — MORPHINE SULFATE 2 MG/ML
2 INJECTION, SOLUTION INTRAMUSCULAR; INTRAVENOUS
Status: DISCONTINUED | OUTPATIENT
Start: 2025-01-28 | End: 2025-01-30 | Stop reason: HOSPADM

## 2025-01-28 RX ORDER — PRAZOSIN HYDROCHLORIDE 1 MG/1
1 CAPSULE ORAL NIGHTLY
Status: DISCONTINUED | OUTPATIENT
Start: 2025-01-28 | End: 2025-01-28 | Stop reason: ALTCHOICE

## 2025-01-28 RX ORDER — SODIUM CHLORIDE 9 MG/ML
INJECTION, SOLUTION INTRAVENOUS
Status: DISCONTINUED | OUTPATIENT
Start: 2025-01-28 | End: 2025-01-28 | Stop reason: SDUPTHER

## 2025-01-28 RX ORDER — POLYETHYLENE GLYCOL 3350 17 G/17G
17 POWDER, FOR SOLUTION ORAL DAILY PRN
Status: DISCONTINUED | OUTPATIENT
Start: 2025-01-28 | End: 2025-01-30 | Stop reason: HOSPADM

## 2025-01-28 RX ORDER — SODIUM CHLORIDE 450 MG/100ML
INJECTION, SOLUTION INTRAVENOUS CONTINUOUS
Status: DISCONTINUED | OUTPATIENT
Start: 2025-01-28 | End: 2025-01-30 | Stop reason: HOSPADM

## 2025-01-28 RX ORDER — LEVOTHYROXINE SODIUM 50 UG/1
50 TABLET ORAL DAILY
Status: DISCONTINUED | OUTPATIENT
Start: 2025-01-28 | End: 2025-01-30 | Stop reason: HOSPADM

## 2025-01-28 RX ORDER — M-VIT,TX,IRON,MINS/CALC/FOLIC 27MG-0.4MG
1 TABLET ORAL DAILY
Status: DISCONTINUED | OUTPATIENT
Start: 2025-01-28 | End: 2025-01-30 | Stop reason: HOSPADM

## 2025-01-28 RX ADMIN — ONDANSETRON 4 MG: 2 INJECTION INTRAMUSCULAR; INTRAVENOUS at 20:14

## 2025-01-28 RX ADMIN — ATORVASTATIN CALCIUM 10 MG: 10 TABLET, FILM COATED ORAL at 11:35

## 2025-01-28 RX ADMIN — LEVOTHYROXINE SODIUM 50 MCG: 0.05 TABLET ORAL at 11:35

## 2025-01-28 RX ADMIN — DILTIAZEM HYDROCHLORIDE 120 MG: 120 CAPSULE, COATED, EXTENDED RELEASE ORAL at 11:35

## 2025-01-28 RX ADMIN — PROPOFOL 200 MG: 10 INJECTION, EMULSION INTRAVENOUS at 18:05

## 2025-01-28 RX ADMIN — OXYCODONE 10 MG: 5 TABLET ORAL at 07:21

## 2025-01-28 RX ADMIN — FENTANYL CITRATE 50 MCG: 50 INJECTION INTRAMUSCULAR; INTRAVENOUS at 18:50

## 2025-01-28 RX ADMIN — HYDROMORPHONE HYDROCHLORIDE 0.5 MG: 1 INJECTION, SOLUTION INTRAMUSCULAR; INTRAVENOUS; SUBCUTANEOUS at 21:09

## 2025-01-28 RX ADMIN — Medication 10 ML: at 11:38

## 2025-01-28 RX ADMIN — FENTANYL CITRATE 25 MCG: 50 INJECTION INTRAMUSCULAR; INTRAVENOUS at 21:25

## 2025-01-28 RX ADMIN — ONDANSETRON 4 MG: 2 INJECTION INTRAMUSCULAR; INTRAVENOUS at 18:05

## 2025-01-28 RX ADMIN — SODIUM CHLORIDE 3000 MG: 9 INJECTION, SOLUTION INTRAVENOUS at 18:04

## 2025-01-28 RX ADMIN — OXYCODONE 10 MG: 5 TABLET ORAL at 23:55

## 2025-01-28 RX ADMIN — LIDOCAINE HYDROCHLORIDE 100 MG: 20 INJECTION, SOLUTION EPIDURAL; INFILTRATION; INTRACAUDAL; PERINEURAL at 18:05

## 2025-01-28 RX ADMIN — ROCURONIUM BROMIDE 50 MG: 10 INJECTION, SOLUTION INTRAVENOUS at 19:00

## 2025-01-28 RX ADMIN — OXYCODONE 10 MG: 5 TABLET ORAL at 14:24

## 2025-01-28 RX ADMIN — Medication 200 MG: at 18:05

## 2025-01-28 RX ADMIN — HYDROMORPHONE HYDROCHLORIDE 0.5 MG: 1 INJECTION, SOLUTION INTRAMUSCULAR; INTRAVENOUS; SUBCUTANEOUS at 21:02

## 2025-01-28 RX ADMIN — SODIUM CHLORIDE: 4.5 INJECTION, SOLUTION INTRAVENOUS at 23:42

## 2025-01-28 RX ADMIN — FENTANYL CITRATE 50 MCG: 50 INJECTION INTRAMUSCULAR; INTRAVENOUS at 18:05

## 2025-01-28 RX ADMIN — METHOCARBAMOL 500 MG: 100 INJECTION INTRAMUSCULAR; INTRAVENOUS at 18:46

## 2025-01-28 RX ADMIN — HYDROMORPHONE HYDROCHLORIDE 0.5 MG: 1 INJECTION, SOLUTION INTRAMUSCULAR; INTRAVENOUS; SUBCUTANEOUS at 10:14

## 2025-01-28 RX ADMIN — ASPIRIN 325 MG: 325 TABLET, COATED ORAL at 23:53

## 2025-01-28 RX ADMIN — ROCURONIUM BROMIDE 50 MG: 10 INJECTION, SOLUTION INTRAVENOUS at 18:10

## 2025-01-28 RX ADMIN — METHOCARBAMOL 500 MG: 100 INJECTION INTRAMUSCULAR; INTRAVENOUS at 19:04

## 2025-01-28 RX ADMIN — SODIUM CHLORIDE: 9 INJECTION, SOLUTION INTRAVENOUS at 18:00

## 2025-01-28 RX ADMIN — Medication 10 ML: at 23:42

## 2025-01-28 RX ADMIN — FENTANYL CITRATE 50 MCG: 50 INJECTION INTRAMUSCULAR; INTRAVENOUS at 18:41

## 2025-01-28 RX ADMIN — DEXAMETHASONE SODIUM PHOSPHATE 8 MG: 4 INJECTION, SOLUTION INTRA-ARTICULAR; INTRALESIONAL; INTRAMUSCULAR; INTRAVENOUS; SOFT TISSUE at 18:05

## 2025-01-28 RX ADMIN — Medication 1 TABLET: at 11:35

## 2025-01-28 RX ADMIN — SUGAMMADEX 400 MG: 100 INJECTION, SOLUTION INTRAVENOUS at 20:35

## 2025-01-28 ASSESSMENT — PAIN DESCRIPTION - FREQUENCY
FREQUENCY: CONTINUOUS
FREQUENCY: CONTINUOUS

## 2025-01-28 ASSESSMENT — PAIN DESCRIPTION - LOCATION
LOCATION: ARM
LOCATION: ELBOW

## 2025-01-28 ASSESSMENT — PAIN DESCRIPTION - ORIENTATION
ORIENTATION: RIGHT

## 2025-01-28 ASSESSMENT — PAIN SCALES - GENERAL
PAINLEVEL_OUTOF10: 10
PAINLEVEL_OUTOF10: 9
PAINLEVEL_OUTOF10: 10
PAINLEVEL_OUTOF10: 9
PAINLEVEL_OUTOF10: 3
PAINLEVEL_OUTOF10: 8
PAINLEVEL_OUTOF10: 0
PAINLEVEL_OUTOF10: 7
PAINLEVEL_OUTOF10: 8
PAINLEVEL_OUTOF10: 5
PAINLEVEL_OUTOF10: 9

## 2025-01-28 ASSESSMENT — PAIN DESCRIPTION - PAIN TYPE
TYPE: SURGICAL PAIN
TYPE: ACUTE PAIN

## 2025-01-28 ASSESSMENT — PAIN DESCRIPTION - ONSET: ONSET: ON-GOING

## 2025-01-28 ASSESSMENT — PAIN DESCRIPTION - DESCRIPTORS
DESCRIPTORS: ACHING
DESCRIPTORS: ACHING
DESCRIPTORS: ACHING;SHOOTING
DESCRIPTORS: ACHING
DESCRIPTORS: ACHING;SHOOTING
DESCRIPTORS: ACHING
DESCRIPTORS: SHARP;SORE
DESCRIPTORS: ACHING
DESCRIPTORS: ACHING
DESCRIPTORS: ACHING;SHARP;SHOOTING

## 2025-01-28 ASSESSMENT — PAIN - FUNCTIONAL ASSESSMENT
PAIN_FUNCTIONAL_ASSESSMENT: PREVENTS OR INTERFERES SOME ACTIVE ACTIVITIES AND ADLS
PAIN_FUNCTIONAL_ASSESSMENT: ACTIVITIES ARE NOT PREVENTED
PAIN_FUNCTIONAL_ASSESSMENT: PREVENTS OR INTERFERES SOME ACTIVE ACTIVITIES AND ADLS
PAIN_FUNCTIONAL_ASSESSMENT: PREVENTS OR INTERFERES WITH ALL ACTIVE AND SOME PASSIVE ACTIVITIES

## 2025-01-28 ASSESSMENT — PAIN DESCRIPTION - DIRECTION: RADIATING_TOWARDS: ARM

## 2025-01-28 NOTE — PLAN OF CARE
Problem: Chronic Conditions and Co-morbidities  Goal: Patient's chronic conditions and co-morbidity symptoms are monitored and maintained or improved  Outcome: Progressing  Flowsheets (Taken 1/27/2025 2100)  Care Plan - Patient's Chronic Conditions and Co-Morbidity Symptoms are Monitored and Maintained or Improved: Monitor and assess patient's chronic conditions and comorbid symptoms for stability, deterioration, or improvement     Problem: Discharge Planning  Goal: Discharge to home or other facility with appropriate resources  Outcome: Progressing  Flowsheets  Taken 1/27/2025 2334  Discharge to home or other facility with appropriate resources: Identify barriers to discharge with patient and caregiver  Taken 1/27/2025 2100  Discharge to home or other facility with appropriate resources: Identify barriers to discharge with patient and caregiver     Problem: Pain  Goal: Verbalizes/displays adequate comfort level or baseline comfort level  Outcome: Progressing     Problem: Safety - Adult  Goal: Free from fall injury  Outcome: Progressing

## 2025-01-28 NOTE — ANESTHESIA PRE PROCEDURE
Department of Anesthesiology  Preprocedure Note       Name:  Kirsten Dacosta   Age:  69 y.o.  :  1955                                          MRN:  [id number]         Date:  2025      Surgeon: Surgeon(s):  Francisco J Fernando MD    Procedure: Procedure(s):  RIGHT HUMERUS OPEN REDUCTION INTERNAL FIXATION OF SUPRACONDYLAR AND SHAFT FRACTURE    Medications prior to admission:   Prior to Admission medications    Medication Sig Start Date End Date Taking? Authorizing Provider   dilTIAZem (CARDIZEM CD) 120 MG extended release capsule TAKE 1 CAPSULE BY MOUTH DAILY  AND DO NOT CRUSH OR BREAK 25  Yes Moshe Mcghee MD   simvastatin (ZOCOR) 20 MG tablet TAKE 1 TABLET BY MOUTH AT NIGHT 25  Yes Moshe Mcghee MD   spironolactone (ALDACTONE) 25 MG tablet TAKE 1 TABLET BY MOUTH DAILY 25  Yes Moshe Mcghee MD   levothyroxine (SYNTHROID) 50 MCG tablet TAKE 1 TABLET BY MOUTH DAILY 25  Yes Moshe Mcghee MD   zinc gluconate 50 MG tablet Take 1 tablet by mouth daily   Yes Karely Camara MD   Potassium 99 MG TABS Take 1 tablet by mouth daily   Yes Karely Camara MD   Cholecalciferol (VITAMIN D) 10 MCG (400 UNIT) CAPS Capsule Take 25 mcg by mouth   Yes Karely Camara MD   Ascorbic Acid 500 MG CHEW 500 mg daily   Yes Karely Camara MD   TURMERIC PO Take by mouth daily   Yes Karely Camara MD   Magnesium 500 MG TABS Take 30 mg by mouth daily   Yes Karely Camara MD   ELIQUIS 5 MG TABS tablet TAKE 1 TABLET BY MOUTH  TWICE DAILY 3/4/21  Yes Moshe Mcghee MD   EFFER-K 20 MEQ TBEF effervescent tablet DISSOLVE AND DRINK 1 TABLET BY MOUTH TWICE DAILY WITH MEALS 19  Yes Moshe Mcghee MD   nystatin (MYCOSTATIN) 010622 UNIT/GM cream Apply topically 2 times daily. 19  Yes Moshe Mcghee MD   polyethylene glycol (GLYCOLAX) packet Take 1 packet by mouth daily as needed 19  Yes Karely Camara MD   Multiple Vitamins-Minerals  Phone outreach to the patient for initial contact, tracking, and assessment  I left message on the patient's vm introducing myself and asking to please return my call to discuss next steps

## 2025-01-28 NOTE — CARE COORDINATION
Case Management Assessment  Initial Evaluation    Date/Time of Evaluation: 1/28/2025 12:28 PM  Assessment Completed by: Luisa Lawrence RN    If patient is discharged prior to next notation, then this note serves as note for discharge by case management.    Patient Name: Kirsten Dacosta                   YOB: 1955  Diagnosis: Closed nondisplaced spiral fracture of shaft of right humerus, initial encounter [S42.344A]  Closed displaced comminuted fracture of shaft of right humerus, initial encounter [S42.351A]  Fall, initial encounter [W19.XXXA]                   Date / Time: 1/27/2025  2:23 PM    Patient Admission Status: Inpatient   Readmission Risk (Low < 19, Mod (19-27), High > 27): Readmission Risk Score: 11.3    Current PCP: Moshe Mcghee MD  PCP verified by ? Yes    Chart Reviewed: Yes      History Provided by: Patient  Patient Orientation: Alert and Oriented, Person, Place, Situation    Patient Cognition: Alert    Hospitalization in the last 30 days (Readmission):  No    If yes, Readmission Assessment in  Navigator will be completed.    Advance Directives:      Code Status: Full Code   Patient's Primary Decision Maker is: Legal Next of Kin    Primary Decision Maker: Phyllis Ellis - Brother/Sister - [phone]    Secondary Decision Maker: JhoanaonKalpana - Brother/Sister - [phone]    Discharge Planning:    Patient lives with: Family Members Type of Home: House  Primary Care Giver: Self  Patient Support Systems include: Family Members   Current Financial resources: Medicare  Current community resources: Other (Comment) ([employer]-transportation)  Current services prior to admission: Durable Medical Equipment            Current DME: Bipap, Bedside Commode, Walker, Wheelchair            Type of Home Care services:  None    ADLS  Prior functional level: Assistance with the following:, Housework, Shopping, Cooking  Current functional level: Assistance with the following:, Housework,

## 2025-01-28 NOTE — PLAN OF CARE
Problem: Chronic Conditions and Co-morbidities  Goal: Patient's chronic conditions and co-morbidity symptoms are monitored and maintained or improved  Outcome: Progressing  Flowsheets (Taken 1/28/2025 0930)  Care Plan - Patient's Chronic Conditions and Co-Morbidity Symptoms are Monitored and Maintained or Improved:   Monitor and assess patient's chronic conditions and comorbid symptoms for stability, deterioration, or improvement   Collaborate with multidisciplinary team to address chronic and comorbid conditions and prevent exacerbation or deterioration     Problem: Discharge Planning  Goal: Discharge to home or other facility with appropriate resources  Outcome: Progressing  Flowsheets (Taken 1/28/2025 0930)  Discharge to home or other facility with appropriate resources: Identify barriers to discharge with patient and caregiver     Problem: Pain  Goal: Verbalizes/displays adequate comfort level or baseline comfort level  Outcome: Progressing     Problem: Safety - Adult  Goal: Free from fall injury  Outcome: Progressing

## 2025-01-29 PROBLEM — S42.411A CLOSED SUPRACONDYLAR FRACTURE OF RIGHT HUMERUS: Status: ACTIVE | Noted: 2025-01-29

## 2025-01-29 LAB
ANION GAP SERPL CALCULATED.3IONS-SCNC: 11 MMOL/L (ref 3–16)
BASOPHILS # BLD: 0.01 K/UL (ref 0–0.2)
BASOPHILS NFR BLD: 0 %
BUN SERPL-MCNC: 17 MG/DL (ref 7–20)
CALCIUM SERPL-MCNC: 8.1 MG/DL (ref 8.3–10.6)
CHLORIDE SERPL-SCNC: 102 MMOL/L (ref 99–110)
CO2 SERPL-SCNC: 23 MMOL/L (ref 21–32)
CREAT SERPL-MCNC: 0.8 MG/DL (ref 0.6–1.2)
EOSINOPHIL # BLD: 0 K/UL (ref 0–0.6)
EOSINOPHILS RELATIVE PERCENT: 0 %
ERYTHROCYTE [DISTWIDTH] IN BLOOD BY AUTOMATED COUNT: 13.4 % (ref 12.4–15.4)
EST. AVERAGE GLUCOSE BLD GHB EST-MCNC: 123 MG/DL
GFR, ESTIMATED: 81 ML/MIN/1.73M2
GLUCOSE BLD-MCNC: 196 MG/DL (ref 70–99)
GLUCOSE BLD-MCNC: 245 MG/DL (ref 70–99)
GLUCOSE BLD-MCNC: 251 MG/DL (ref 70–99)
GLUCOSE SERPL-MCNC: 191 MG/DL (ref 70–99)
HBA1C MFR BLD: 5.9 %
HCT VFR BLD AUTO: 26.7 % (ref 36–48)
HGB BLD-MCNC: 8.7 G/DL (ref 12–16)
IMM GRANULOCYTES # BLD AUTO: 0.03 K/UL (ref 0–0.5)
IMM GRANULOCYTES NFR BLD: 0 %
LYMPHOCYTES NFR BLD: 0.78 K/UL (ref 1–5.1)
LYMPHOCYTES RELATIVE PERCENT: 7 %
MCH RBC QN AUTO: 28.5 PG (ref 26–34)
MCHC RBC AUTO-ENTMCNC: 32.6 G/DL (ref 31–36)
MCV RBC AUTO: 87.5 FL (ref 80–100)
MONOCYTES NFR BLD: 0.86 K/UL (ref 0–1.3)
MONOCYTES NFR BLD: 7 %
NEUTROPHILS NFR BLD: 86 %
NEUTS SEG NFR BLD: 10.09 K/UL (ref 1.7–7.7)
PLATELET # BLD AUTO: 209 K/UL (ref 135–450)
PMV BLD AUTO: 11.7 FL
POTASSIUM SERPL-SCNC: 4.6 MMOL/L (ref 3.5–5.1)
RBC # BLD AUTO: 3.05 M/UL (ref 4–5.2)
SODIUM SERPL-SCNC: 135 MMOL/L (ref 136–145)
WBC OTHER # BLD: 11.8 K/UL (ref 4–11)

## 2025-01-29 PROCEDURE — 85025 COMPLETE CBC W/AUTO DIFF WBC: CPT

## 2025-01-29 PROCEDURE — 2580000003 HC RX 258: Performed by: ORTHOPAEDIC SURGERY

## 2025-01-29 PROCEDURE — 36415 COLL VENOUS BLD VENIPUNCTURE: CPT

## 2025-01-29 PROCEDURE — 6370000000 HC RX 637 (ALT 250 FOR IP): Performed by: ORTHOPAEDIC SURGERY

## 2025-01-29 PROCEDURE — 6360000002 HC RX W HCPCS: Performed by: ORTHOPAEDIC SURGERY

## 2025-01-29 PROCEDURE — 97535 SELF CARE MNGMENT TRAINING: CPT

## 2025-01-29 PROCEDURE — 80048 BASIC METABOLIC PNL TOTAL CA: CPT

## 2025-01-29 PROCEDURE — 99024 POSTOP FOLLOW-UP VISIT: CPT | Performed by: PHYSICIAN ASSISTANT

## 2025-01-29 PROCEDURE — 2060000000 HC ICU INTERMEDIATE R&B

## 2025-01-29 PROCEDURE — 97166 OT EVAL MOD COMPLEX 45 MIN: CPT

## 2025-01-29 PROCEDURE — 97530 THERAPEUTIC ACTIVITIES: CPT

## 2025-01-29 PROCEDURE — 6370000000 HC RX 637 (ALT 250 FOR IP): Performed by: HOSPITALIST

## 2025-01-29 PROCEDURE — 82962 GLUCOSE BLOOD TEST: CPT

## 2025-01-29 PROCEDURE — 2500000003 HC RX 250 WO HCPCS: Performed by: ORTHOPAEDIC SURGERY

## 2025-01-29 PROCEDURE — 97162 PT EVAL MOD COMPLEX 30 MIN: CPT

## 2025-01-29 RX ORDER — INSULIN GLARGINE 100 [IU]/ML
5 INJECTION, SOLUTION SUBCUTANEOUS NIGHTLY
Status: DISCONTINUED | OUTPATIENT
Start: 2025-01-29 | End: 2025-01-30 | Stop reason: HOSPADM

## 2025-01-29 RX ADMIN — OXYCODONE 10 MG: 5 TABLET ORAL at 19:55

## 2025-01-29 RX ADMIN — ASPIRIN 325 MG: 325 TABLET, COATED ORAL at 09:08

## 2025-01-29 RX ADMIN — SODIUM CHLORIDE 3000 MG: 9 INJECTION, SOLUTION INTRAVENOUS at 11:47

## 2025-01-29 RX ADMIN — ACETAMINOPHEN 650 MG: 325 TABLET ORAL at 11:57

## 2025-01-29 RX ADMIN — INSULIN GLARGINE 5 UNITS: 100 INJECTION, SOLUTION SUBCUTANEOUS at 21:26

## 2025-01-29 RX ADMIN — LEVOTHYROXINE SODIUM 50 MCG: 0.05 TABLET ORAL at 05:00

## 2025-01-29 RX ADMIN — SPIRONOLACTONE 25 MG: 25 TABLET ORAL at 09:08

## 2025-01-29 RX ADMIN — OXYCODONE 10 MG: 5 TABLET ORAL at 13:11

## 2025-01-29 RX ADMIN — POTASSIUM BICARBONATE 20 MEQ: 782 TABLET, EFFERVESCENT ORAL at 09:08

## 2025-01-29 RX ADMIN — INSULIN LISPRO 2 UNITS: 100 INJECTION, SOLUTION INTRAVENOUS; SUBCUTANEOUS at 11:43

## 2025-01-29 RX ADMIN — SODIUM CHLORIDE 3000 MG: 9 INJECTION, SOLUTION INTRAVENOUS at 03:55

## 2025-01-29 RX ADMIN — OXYCODONE 10 MG: 5 TABLET ORAL at 04:57

## 2025-01-29 RX ADMIN — DILTIAZEM HYDROCHLORIDE 120 MG: 120 CAPSULE, COATED, EXTENDED RELEASE ORAL at 09:08

## 2025-01-29 RX ADMIN — Medication 10 ML: at 19:55

## 2025-01-29 RX ADMIN — Medication 1 TABLET: at 09:08

## 2025-01-29 RX ADMIN — ATORVASTATIN CALCIUM 10 MG: 10 TABLET, FILM COATED ORAL at 09:08

## 2025-01-29 RX ADMIN — OXYCODONE 10 MG: 5 TABLET ORAL at 09:07

## 2025-01-29 RX ADMIN — INSULIN LISPRO 1 UNITS: 100 INJECTION, SOLUTION INTRAVENOUS; SUBCUTANEOUS at 17:06

## 2025-01-29 RX ADMIN — INSULIN LISPRO 1 UNITS: 100 INJECTION, SOLUTION INTRAVENOUS; SUBCUTANEOUS at 21:27

## 2025-01-29 RX ADMIN — ASPIRIN 325 MG: 325 TABLET, COATED ORAL at 19:55

## 2025-01-29 ASSESSMENT — PAIN DESCRIPTION - DESCRIPTORS
DESCRIPTORS: SORE
DESCRIPTORS: ACHING

## 2025-01-29 ASSESSMENT — PAIN SCALES - GENERAL
PAINLEVEL_OUTOF10: 4
PAINLEVEL_OUTOF10: 8
PAINLEVEL_OUTOF10: 3
PAINLEVEL_OUTOF10: 3
PAINLEVEL_OUTOF10: 4
PAINLEVEL_OUTOF10: 5
PAINLEVEL_OUTOF10: 8
PAINLEVEL_OUTOF10: 7
PAINLEVEL_OUTOF10: 5
PAINLEVEL_OUTOF10: 7

## 2025-01-29 ASSESSMENT — PAIN DESCRIPTION - ORIENTATION
ORIENTATION: RIGHT

## 2025-01-29 ASSESSMENT — PAIN DESCRIPTION - FREQUENCY: FREQUENCY: CONTINUOUS

## 2025-01-29 ASSESSMENT — PAIN DESCRIPTION - DIRECTION: RADIATING_TOWARDS: ARM

## 2025-01-29 ASSESSMENT — PAIN - FUNCTIONAL ASSESSMENT: PAIN_FUNCTIONAL_ASSESSMENT: PREVENTS OR INTERFERES WITH ALL ACTIVE AND SOME PASSIVE ACTIVITIES

## 2025-01-29 ASSESSMENT — PAIN DESCRIPTION - LOCATION
LOCATION: ELBOW
LOCATION: ARM;ELBOW
LOCATION: ARM;ELBOW
LOCATION: ELBOW

## 2025-01-29 ASSESSMENT — PAIN DESCRIPTION - PAIN TYPE: TYPE: SURGICAL PAIN

## 2025-01-29 ASSESSMENT — PAIN DESCRIPTION - ONSET: ONSET: ON-GOING

## 2025-01-29 NOTE — BRIEF OP NOTE
Brief Postoperative Note      Patient: Kirsten Dacosta  YOB: 1955  MRN: [id number]    Date of Procedure: 1/28/2025    Pre-Op Diagnosis Codes:      * Other closed  displaced fracture of distal end of right humerus and shaft, initial encounter [S42.494A]    Post-Op Diagnosis: Same       Procedure(s):  RIGHT HUMERUS OPEN REDUCTION INTERNAL FIXATION OF SUPRACONDYLAR AND SHAFT FRACTURE    Surgeon(s):  Francisco J Fernando MD    Assistant:  Surgical Assistant: Edu Pena Hashim    Anesthesia: General    Estimated Blood Loss (mL): 200     Complications: None    Specimens:   * No specimens in log *    Implants:  Implant Name Type Inv. Item Serial No.  Lot No. LRB No. Used Action   SCREW BNE L16MM DIA3.5MM KENDY TI MATT FULL THRD T10 DRV FOR - [id number]  SCREW BNE L16MM DIA3.5MM KENDY TI MATT FULL THRD T10 DRV FOR  [employer] [facility]  Right 1 Implanted   SCREW BNE L22MM DIA3.5MM TI ALLY MATT FULL THRD T10 DRV - [id number]  SCREW BNE L22MM DIA3.5MM TI ALLY MATT FULL THRD T10 DRV  [employer] [facility]  Right 1 Implanted   SCREW BNE L24MM DIA3.5MM TI ALLY NONLOCKING FULL THRD T10 - [id number]  SCREW BNE L24MM DIA3.5MM TI ALLY NONLOCKING FULL THRD T10  [employer] [facility]  Right 1 Implanted   SCREW BNE AD PED L32MM DIA3.5MM KENDY TI NONCANNULATED - [id number]  SCREW BNE AD PED L32MM DIA3.5MM KENDY TI NONCANNULATED  [employer] [facility]  Right 1 Implanted   SCREW BNE L20MM DIA2.7MM KENDY TI NONLOCKING FULL THRD T10 - [id number]  SCREW BNE L20MM DIA2.7MM KENDY TI NONLOCKING FULL THRD T10  [employer] [facility]  Right 1 Implanted   SCREW BNE L22MM DIA2.7MM TI ALLY NONLOCKING FULL THRD T10 - [id number]  SCREW BNE L22MM DIA2.7MM TI ALLY NONLOCKING FULL THRD T10  [employer]  Right 1 Implanted   SCREW BNE L24MM OD2.7MM NONSTERILE FULL THRD T10 DRV [employer] - [id number]  SCREW BNE L24MM OD2.7MM NONSTERILE FULL THRD T10 DRV VARIAX  [employer]

## 2025-01-29 NOTE — PLAN OF CARE
Problem: Occupational Therapy - Adult  Goal: By Discharge: Performs self-care activities at highest level of function for planned discharge setting.  See evaluation for individualized goals.  Outcome: Progressing   Hermila Last OTR

## 2025-01-29 NOTE — CARE COORDINATION
Spoke with patient at bedside, she has chosen the following SNFs in order of preference for referrals:  [facility]-no beds available  [facility]  [facility]  [facility]-reviewing, call placed to f/u @ 1600, VM left    Luisa Lawrence RN      Addendum 1615: Call back from the [facility], they are able to accept patient, are in-network and will initiate precert.    Luisa Lawrence RN

## 2025-01-29 NOTE — PROGRESS NOTES
Hospitalist Progress Note  1/28/2025 9:01 AM    PCP: Moshe Mcghee MD    [id number]     Date of Admission: 1/27/2025                                                                                                                     HOSPITAL COURSE    Patient demographics:  The patient  Kirsten Dacosta is a 69 y.o. female     Significant past medical history:   Patient Active Problem List   Diagnosis    Pure hypercholesterolemia    Pre-diabetes    Benign hypertension    Hyperparathyroidism (HCC)    Arthritis of knee    Osteoporosis without current pathological fracture    Hypercalcemia    Postmenopausal osteoporosis    Premature menopause    Vitamin D deficiency    Hypomagnesemia    COPD (chronic obstructive pulmonary disease) (HCC)    Chronic respiratory failure with hypercapnia    Paroxysmal atrial fibrillation (HCC)    Morbidly obese    Body mass index (BMI) 50.0-59.9, adult    Closed nondisplaced spiral fracture of shaft of right humerus, initial encounter    Fall    Closed displaced comminuted fracture of shaft of right humerus         Presenting symptoms:      Diagnostic workup:      CONSULTS DURING ADMISSION :   IP CONSULT TO ORTHOPEDIC SURGERY      Patient was diagnosed with:  Right humeral closed fracture  Paroxysmal atrial fibrillation patient on Eliquis    Treatment while inpatient:  69 y.o. female who presented to [facility] with fall from wheelchair resulting in right humerus fracture.  PMHx significant for paroxysmal A-fib on Eliquis, obesity, COPD, hyperparathyroidism, type 2 diabetes, hypertension, limited mobility uses a walker and wheelchair at home, hypothyroidism, chronic anemia, HFpEF.       Patient presented to the emergency room after patient fell from the wheelchair on her right arm   Orthopedic surgery was consulted and patient underwent open reduction and internal fixation                                                                                   
    Hospitalist Progress Note  1/29/2025 8:38 AM    PCP: Moshe Mcghee MD    [id number]     Date of Admission: 1/27/2025                                                                                                                     HOSPITAL COURSE    Patient demographics:  The patient  Kirsten Dacosta is a 70 y.o. female     Significant past medical history:   Patient Active Problem List   Diagnosis    Pure hypercholesterolemia    Pre-diabetes    Benign hypertension    Hyperparathyroidism (HCC)    Arthritis of knee    Osteoporosis without current pathological fracture    Hypercalcemia    Postmenopausal osteoporosis    Premature menopause    Vitamin D deficiency    Hypomagnesemia    COPD (chronic obstructive pulmonary disease) (HCC)    Chronic respiratory failure with hypercapnia    Paroxysmal atrial fibrillation (HCC)    Morbidly obese    Body mass index (BMI) 50.0-59.9, adult    Closed nondisplaced spiral fracture of shaft of right humerus, initial encounter    Fall    Closed displaced comminuted fracture of shaft of right humerus    Closed supracondylar fracture of right humerus         Presenting symptoms:      Diagnostic workup:      CONSULTS DURING ADMISSION :   IP CONSULT TO ORTHOPEDIC SURGERY  IP CONSULT TO SOCIAL WORK      Patient was diagnosed with:  Right humeral closed fracture  Paroxysmal atrial fibrillation patient on Eliquis    Treatment while inpatient:  69 y.o. female who presented to [facility] with fall from wheelchair resulting in right humerus fracture.  PMHx significant for paroxysmal A-fib on Eliquis, obesity, COPD, hyperparathyroidism, type 2 diabetes, hypertension, limited mobility uses a walker and wheelchair at home, hypothyroidism, chronic anemia, HFpEF.       Patient presented to the emergency room after patient fell from the wheelchair on her right arm   Orthopedic surgery was consulted and patient underwent open reduction and internal fixation               
  [facility] - Rehabilitation Department       Occupational Therapy  3202/3202-01  [facility] 3 PROGRESSIVE CARE    [x] Initial Evaluation            [x] Daily Treatment Note         [] Discharge Summary      Patient: Kirsten Dacosta   : 1955   MRN: [id number]   Date of Service:  2025    Admitting Diagnosis:  Closed nondisplaced spiral fracture of shaft of right humerus, initial encounter  Referring Physician: Francisco J Fernando MD   Current Admission Summary: Francisco J Fernando MD Op Note :  \"ADMIT DATE:2025     PROVIDER:Francisco J Fernando MD     DATE OF PROCEDURE:  2025     SURGEON:  Francisco J Fernando MD     ASSISTANTS:  Flaquita.     PREOPERATIVE DIAGNOSES:    1. Right humerus supracondylar displaced fracture.  2. Right humerus midshaft comminuted displaced fracture.     POSTOPERATIVE DIAGNOSES:    1. Right humerus supracondylar displaced fracture.  2. Right humerus midshaft comminuted displaced fracture.     PROCEDURES DONE:    1. Open treatment of right distal humerus supracondylar fracture with open reduction and internal fixation.  2. Open treatment of right humerus midshaft comminuted displaced fracture with open reduction and internal fixation using plate and screws.    INDICATION: This is a 69-year-old white female with a BMI of 65, who sustained a fall off a wheelchair with injury to her right arm and elbow. She was brought by EMS to [facility] where she was found to have a comminuted humerus mid shaft fracture as well as a separate supracondylar humerus fracture. All risks, benefits, and alternatives were discussed with the patient. She agreed to proceed with surgical fixation.     POSTOPERATIVE PLAN: The patient will be readmitted as an inpatient. She is nonweightbearing on the right upper extremity for at least 6 weeks, but she can start range of motion of the elbow and shoulder.     Joel Warner MD  H&P:  \"Chief Admission Complaint: Fall, right arm 
  [facility] - Rehabilitation Department      Physical Therapy  3202/3202-01  [facility] 3 PROGRESSIVE CARE    [x] Initial Evaluation            [x] Daily Treatment Note         [] Discharge Summary      Patient: Kirsten Dacosta   : 1955   MRN: [id number]   Date of Service:  2025   Admitting Diagnosis: Closed nondisplaced spiral fracture of shaft of right humerus, initial encounter  Referring Physician: Francisco J Fernando MD   Current Admission Summary: Francisco J Fernando MD Op Note :  \"ADMIT DATE:2025     PROVIDER:Francisco J Fernando MD     DATE OF PROCEDURE:  2025     SURGEON:  Francisco J Fernando MD     ASSISTANTS:  Flaquita.     PREOPERATIVE DIAGNOSES:    1. Right humerus supracondylar displaced fracture.  2. Right humerus midshaft comminuted displaced fracture.     POSTOPERATIVE DIAGNOSES:    1. Right humerus supracondylar displaced fracture.  2. Right humerus midshaft comminuted displaced fracture.     PROCEDURES DONE:    1. Open treatment of right distal humerus supracondylar fracture with open reduction and internal fixation.  2. Open treatment of right humerus midshaft comminuted displaced fracture with open reduction and internal fixation using plate and screws.     INDICATION: This is a 69-year-old white female with a BMI of 65, who sustained a fall off a wheelchair with injury to her right arm and elbow. She was brought by EMS to [facility] where she was found to have a comminuted humerus mid shaft fracture as well as a separate supracondylar humerus fracture. All risks, benefits, and alternatives were discussed with the patient. She agreed to proceed with surgical fixation.      POSTOPERATIVE PLAN: The patient will be readmitted as an inpatient. She is nonweightbearing on the right upper extremity for at least 6 weeks, but she can start range of motion of the elbow and shoulder.      Joel Warner MD  H&P:  \"Chief Admission Complaint: Fall, right arm 
Home med rec completed. Perfect serve sent to Dr. Hubbard  
Pt Aox4, VS, RA. Nontele. Pt resting in bed, semifowlers with pillow support, continually refuses to be repositioned.  Splint to RUE in place. Pain managed effectively with prn medications. Pt to have surgery at 1730. Consent obtained. CHG bath complete. Pt resting in bed, denies needs at this time. Call light in reach, bed alarm on for safety.   
Pt arrival to PACU. Pt awakening upon arrival. Pt on 4 L O2. Vital sign WDL. Pt dressing on right side is clean dry and intact. Pulse is palpable +2 to right radial. Skin pink and warm. Will continue to monitor.   
Pt vitals are WDL. Pt states pain is better and is resting comfortably on 2 L of O2. Dressing remains clean dry and intact. Pulses on right radial  =+ 2, pt fingers warm. Report given to Kaela STERLING. Will transport   
Spiritual Health History and Assessment/Progress Note  [facility]    Initial Encounter,  , Anticipatory Grief,      Name: Kirsten Dacosta MRN: [id number]    Age: 70 y.o.     Sex: female   Language: English   Buddhist: Unknown   Closed nondisplaced spiral fracture of shaft of right humerus, initial encounter     Date: 1/29/2025            Total Time Calculated: 10 min              Spiritual Assessment began in [facility] 3 PROGRESSIVE CARE        Referral/Consult From: Rounding   Encounter Overview/Reason: Initial Encounter  Service Provided For: Patient    Robyn, Belief, Meaning:   Patient identifies as spiritual, is connected with a robyn tradition or spiritual practice, and has beliefs or practices that help with coping during difficult times  Family/Friends No family/friends present      Importance and Influence:  Patient has spiritual/personal beliefs that influence decisions regarding their health  Family/Friends No family/friends present    Community:  Patient Other: Patient is looking for a robyn community to connect with  Family/Friends No family/friends present    Assessment and Plan of Care:     Patient Interventions include: Other: Active listening, support, [employer] and prayer  Family/Friends Interventions include: No family/friends present    Patient Plan of Care: Other: Plan to follow up with patient  Family/Friends Plan of Care: No family/friends present    Electronically signed by ALL Leal on 1/29/2025 at 2:28 PM   
Teaching / education initiated regarding perioperative experience, expectations, and pain management during stay. Patient verbalized understanding.    
pt moving arm frequently, pushing and pulling things, adjusting self in bed. reminded of NWB status but dressing now saturated in blood. Dr Guerrero notified via perfect serve, will re-enforce dressing per order.   
    01/29/2025 06:21 AM       Renal:   Lab Results   Component Value Date/Time     01/29/2025 06:21 AM    K 4.6 01/29/2025 06:21 AM     01/29/2025 06:21 AM    CO2 23 01/29/2025 06:21 AM    BUN 17 01/29/2025 06:21 AM    CREATININE 0.8 01/29/2025 06:21 AM    GLUCOSE 191 01/29/2025 06:21 AM    CALCIUM 8.1 01/29/2025 06:21 AM            Assessment:      right humerus ORIF.     Plan:      1:  PT/OT as able. NWB on the right arm for at least 6 weeks.  Okay for ROM of the hand/wrist, elbow, shld.  Continue with drsg changes as needed for drainage.    2:  Continue Deep venous thrombosis prophylaxis - will plan to hold eliquis for today again given increased drainage and bleeding at surgical site.  Hopefully restart in next 1-2 days.  3:  Continue Pain Control - minimize opioids as able to maintain adequate pain control. Ice for swellng  4:  Discharge Skilled Facility anticipated    ALEM Soto

## 2025-01-29 NOTE — ANESTHESIA POSTPROCEDURE EVALUATION
Department of Anesthesiology  Postprocedure Note    Patient: Kirsten Dacosta  MRN: [id number]  YOB: 1955  Date of evaluation: 1/28/2025    Procedure Summary       Date: 01/28/25 Room / Location: [address]    Anesthesia Start: 1800 Anesthesia Stop: 2102    Procedure: RIGHT HUMERUS OPEN REDUCTION INTERNAL FIXATION OF SUPRACONDYLAR AND SHAFT FRACTURE (Right: Shoulder) Diagnosis:       Other closed nondisplaced fracture of distal end of right humerus, initial encounter      (Other closed nondisplaced fracture of distal end of right humerus, initial encounter [S42.494A])    Surgeons: Francisco J Fernando MD Responsible Provider: Sukhjinder Villafuerte MD    Anesthesia Type: general ASA Status: 4            Anesthesia Type: No value filed.    Axel Phase I: Axel Score: 10    Axel Phase II: Axel Score: 8    Anesthesia Post Evaluation    Patient location during evaluation: PACU  Patient participation: complete - patient participated  Level of consciousness: awake  Airway patency: patent  Nausea & Vomiting: no nausea and no vomiting  Cardiovascular status: blood pressure returned to baseline  Respiratory status: acceptable  Hydration status: stable  Comments: Vital signs stable  OK to discharge from Stage I post anesthesia care.  Care transferred from Anesthesiology department on discharge from perioperative area   Multimodal analgesia pain management approach  Pain management: satisfactory to patient    No notable events documented.

## 2025-01-29 NOTE — OP NOTE
[facility]                            OPERATIVE REPORT    PATIENT NAME: CURTIS ESPINOZA                : 1955    MED REC NO: [id number]                          ROOM: 3202    ACCOUNT NO: [id number]                           ADMIT DATE: 2025    PROVIDER: Francisco J Fernando MD    DATE OF PROCEDURE:  2025    SURGEON:  Francisco J Fernando MD    ASSISTANTS:  Nimesh and Freya.    PREOPERATIVE DIAGNOSES:    1. Right humerus supracondylar displaced fracture.  2. Right humerus midshaft comminuted displaced fracture.    POSTOPERATIVE DIAGNOSES:    1. Right humerus supracondylar displaced fracture.  2. Right humerus midshaft comminuted displaced fracture.    PROCEDURES DONE:    1. Open treatment of right distal humerus supracondylar fracture with open reduction and internal fixation.  2. Open treatment of right humerus midshaft comminuted displaced fracture with open reduction and internal fixation using plate and screws.    ANESTHESIA:  General anesthesia.    BLOOD LOSS:  250 mL.    COMPLICATIONS:  None.    APPROACH:  Posterior approach.    IMPLANTS USED:    1. [place] 10-hole VariAx J-type posterolateral plate.  2. [place] titanium medial distal humerus locking plate.    INDICATION:  This is a 69-year-old white female with a BMI of 65, who sustained a fall off a wheelchair with injury to her right arm and elbow.  She was brought by EMS to [facility] where she was found to have a comminuted humerus mid shaft fracture as well as a separate supracondylar humerus fracture.  All risks, benefits, and alternatives were discussed with the patient.  She agreed to proceed with surgical fixation.    Given the patient's Body mass index is 65 kg/m². and unstable 2 different fractures added significant challenge to the procedure. It required significant physical and mental effort. It required 200% more time for such procedure.     DESCRIPTION OF PROCEDURE:  The

## 2025-01-29 NOTE — PLAN OF CARE
Problem: Chronic Conditions and Co-morbidities  Goal: Patient's chronic conditions and co-morbidity symptoms are monitored and maintained or improved  Outcome: Progressing  Flowsheets  Taken 1/29/2025 1039 by Mari Busch RN  Care Plan - Patient's Chronic Conditions and Co-Morbidity Symptoms are Monitored and Maintained or Improved:   Monitor and assess patient's chronic conditions and comorbid symptoms for stability, deterioration, or improvement   Collaborate with multidisciplinary team to address chronic and comorbid conditions and prevent exacerbation or deterioration   Update acute care plan with appropriate goals if chronic or comorbid symptoms are exacerbated Problem: Discharge Planning  Goal: Discharge to home or other facility with appropriate resources  Outcome: Progressing  Flowsheets  Taken 1/29/2025 1039 by Mari Busch RN  Discharge to home or other facility with appropriate resources: Identify barriers to discharge with patient and caregiver  Problem: Pain  Goal: Verbalizes/displays adequate comfort level or baseline comfort level  Outcome: Progressing  Flowsheets  Taken 1/29/2025 1039 by Mari Busch RN  Verbalizes/displays adequate comfort level or baseline comfort level:   Encourage patient to monitor pain and request assistance   Assess pain using appropriate pain scale  Problem: Safety - Adult  Goal: Free from fall injury  Outcome: Progressing  Flowsheets (Taken 1/29/2025 1039)  Free From Fall Injury: Based on caregiver fall risk screen, instruct family/caregiver to ask for assistance with transferring infant if caregiver noted to have fall risk factors     Problem: Skin/Tissue Integrity  Goal: Skin integrity remains intact  Description: 1.  Monitor for areas of redness and/or skin breakdown  2.  Assess vascular access sites hourly  3.  Every 4-6 hours minimum:  Change oxygen saturation probe site  4.  Every 4-6 hours:  If on nasal continuous positive airway

## 2025-01-30 VITALS
TEMPERATURE: 98.6 F | HEART RATE: 100 BPM | WEIGHT: 293 LBS | SYSTOLIC BLOOD PRESSURE: 122 MMHG | RESPIRATION RATE: 18 BRPM | BODY MASS INDEX: 53.92 KG/M2 | HEIGHT: 62 IN | DIASTOLIC BLOOD PRESSURE: 71 MMHG | OXYGEN SATURATION: 100 %

## 2025-01-30 LAB
ANION GAP SERPL CALCULATED.3IONS-SCNC: 8 MMOL/L (ref 3–16)
BUN SERPL-MCNC: 23 MG/DL (ref 7–20)
CALCIUM SERPL-MCNC: 8.1 MG/DL (ref 8.3–10.6)
CHLORIDE SERPL-SCNC: 102 MMOL/L (ref 99–110)
CO2 SERPL-SCNC: 25 MMOL/L (ref 21–32)
CREAT SERPL-MCNC: 0.7 MG/DL (ref 0.6–1.2)
ERYTHROCYTE [DISTWIDTH] IN BLOOD BY AUTOMATED COUNT: 13.5 % (ref 12.4–15.4)
GFR, ESTIMATED: 88 ML/MIN/1.73M2
GLUCOSE BLD-MCNC: 191 MG/DL (ref 70–99)
GLUCOSE SERPL-MCNC: 150 MG/DL (ref 70–99)
HCT VFR BLD AUTO: 23.3 % (ref 36–48)
HGB BLD-MCNC: 7.5 G/DL (ref 12–16)
MCH RBC QN AUTO: 29 PG (ref 26–34)
MCHC RBC AUTO-ENTMCNC: 32.2 G/DL (ref 31–36)
MCV RBC AUTO: 90 FL (ref 80–100)
PLATELET # BLD AUTO: 169 K/UL (ref 135–450)
PMV BLD AUTO: 11.9 FL (ref 9.4–12.4)
POTASSIUM SERPL-SCNC: 4.4 MMOL/L (ref 3.5–5.1)
RBC # BLD AUTO: 2.59 M/UL (ref 4–5.2)
SODIUM SERPL-SCNC: 136 MMOL/L (ref 136–145)
WBC OTHER # BLD: 8.6 K/UL (ref 4–11)

## 2025-01-30 PROCEDURE — 6370000000 HC RX 637 (ALT 250 FOR IP): Performed by: ORTHOPAEDIC SURGERY

## 2025-01-30 PROCEDURE — 80048 BASIC METABOLIC PNL TOTAL CA: CPT

## 2025-01-30 PROCEDURE — 36415 COLL VENOUS BLD VENIPUNCTURE: CPT

## 2025-01-30 PROCEDURE — 2500000003 HC RX 250 WO HCPCS: Performed by: ORTHOPAEDIC SURGERY

## 2025-01-30 PROCEDURE — 82962 GLUCOSE BLOOD TEST: CPT

## 2025-01-30 PROCEDURE — 85027 COMPLETE CBC AUTOMATED: CPT

## 2025-01-30 RX ORDER — OXYCODONE HYDROCHLORIDE 5 MG/1
5 TABLET ORAL EVERY 4 HOURS PRN
Qty: 20 TABLET | Refills: 0 | Status: SHIPPED | OUTPATIENT
Start: 2025-01-30 | End: 2025-02-04

## 2025-01-30 RX ADMIN — OXYCODONE 10 MG: 5 TABLET ORAL at 00:31

## 2025-01-30 RX ADMIN — ACETAMINOPHEN 650 MG: 325 TABLET ORAL at 08:01

## 2025-01-30 RX ADMIN — POTASSIUM BICARBONATE 20 MEQ: 782 TABLET, EFFERVESCENT ORAL at 08:01

## 2025-01-30 RX ADMIN — OXYCODONE 10 MG: 5 TABLET ORAL at 13:53

## 2025-01-30 RX ADMIN — ACETAMINOPHEN 650 MG: 325 TABLET ORAL at 13:53

## 2025-01-30 RX ADMIN — OXYCODONE 10 MG: 5 TABLET ORAL at 08:01

## 2025-01-30 RX ADMIN — DILTIAZEM HYDROCHLORIDE 120 MG: 120 CAPSULE, COATED, EXTENDED RELEASE ORAL at 08:01

## 2025-01-30 RX ADMIN — SPIRONOLACTONE 25 MG: 25 TABLET ORAL at 08:01

## 2025-01-30 RX ADMIN — ASPIRIN 325 MG: 325 TABLET, COATED ORAL at 08:01

## 2025-01-30 RX ADMIN — Medication 1 TABLET: at 08:01

## 2025-01-30 RX ADMIN — LEVOTHYROXINE SODIUM 50 MCG: 0.05 TABLET ORAL at 05:55

## 2025-01-30 RX ADMIN — ATORVASTATIN CALCIUM 10 MG: 10 TABLET, FILM COATED ORAL at 08:01

## 2025-01-30 RX ADMIN — Medication 10 ML: at 08:02

## 2025-01-30 ASSESSMENT — PAIN DESCRIPTION - ORIENTATION
ORIENTATION: RIGHT
ORIENTATION: RIGHT

## 2025-01-30 ASSESSMENT — PAIN DESCRIPTION - LOCATION
LOCATION: ELBOW;ARM
LOCATION: ARM;ELBOW

## 2025-01-30 ASSESSMENT — PAIN SCALES - GENERAL
PAINLEVEL_OUTOF10: 3
PAINLEVEL_OUTOF10: 7
PAINLEVEL_OUTOF10: 9
PAINLEVEL_OUTOF10: 0
PAINLEVEL_OUTOF10: 5
PAINLEVEL_OUTOF10: 10

## 2025-01-30 ASSESSMENT — PAIN DESCRIPTION - DESCRIPTORS
DESCRIPTORS: ACHING
DESCRIPTORS: ACHING

## 2025-01-30 NOTE — PLAN OF CARE
Problem: Chronic Conditions and Co-morbidities  Goal: Patient's chronic conditions and co-morbidity symptoms are monitored and maintained or improved  Outcome: Progressing  Flowsheets (Taken 1/30/2025 1035)  Care Plan - Patient's Chronic Conditions and Co-Morbidity Symptoms are Monitored and Maintained or Improved:   Monitor and assess patient's chronic conditions and comorbid symptoms for stability, deterioration, or improvement   Collaborate with multidisciplinary team to address chronic and comorbid conditions and prevent exacerbation or deterioration   Update acute care plan with appropriate goals if chronic or comorbid symptoms are exacerbated and prevent overall improvement and discharge     Problem: Discharge Planning  Goal: Discharge to home or other facility with appropriate resources  Outcome: Progressing  Flowsheets (Taken 1/30/2025 1035)  Discharge to home or other facility with appropriate resources:   Identify barriers to discharge with patient and caregiver   Arrange for needed discharge resources and transportation as appropriate     Problem: Pain  Goal: Verbalizes/displays adequate comfort level or baseline comfort level  Outcome: Progressing  Flowsheets (Taken 1/30/2025 1035)  Verbalizes/displays adequate comfort level or baseline comfort level:   Encourage patient to monitor pain and request assistance   Assess pain using appropriate pain scale     Problem: Safety - Adult  Goal: Free from fall injury  Outcome: Progressing  Flowsheets (Taken 1/30/2025 1035)  Free From Fall Injury:   Instruct family/caregiver on patient safety   Based on caregiver fall risk screen, instruct family/caregiver to ask for assistance with transferring infant if caregiver noted to have fall risk factors     Problem: Skin/Tissue Integrity  Goal: Skin integrity remains intact  Description: 1.  Monitor for areas of redness and/or skin breakdown  2.  Assess vascular access sites hourly  3.  Every 4-6 hours minimum:  Change

## 2025-01-30 NOTE — CARE COORDINATION
CASE MANAGEMENT DISCHARGE SUMMARY      Discharge to: [facility]/SNF    Precertification completed: yes  Hospital Exemption Notification (HENS) completed: [id number]     IMM given: 1/30/25    Transportation:    Medical Transport explained to pt/family. Pt/family voice no agency preference.       Ambulance form completed: Yes    Confirmed discharge plan with:     Patient: yes     Facility/Agency, name:  [facility]   Phone number for report to facility: [phone]     RN, name: Mari    Note: Discharging nurse to complete ERVIN, reconcile AVS, and place final copy with patient's discharge packet. RN to ensure that written prescriptions for  Level II medications are sent with patient to the facility as per protocol.

## 2025-01-30 NOTE — FLOWSHEET NOTE
Patient IV removed, Vitals WDL, Right arm wrapped again with abd pads and new ace wraps. Patient to be transported to [facility]. Patient personal belongings were placed in bags and sent with patient. Items included are cell phone and , glasses, cpap machine, jewelry in a denture cup that the patient was aware of. Patient transferred to [facility] with assistance of multiple staff. Patient narcotic script in paperwork provided to ambulance transport. Report called to [facility] at [phone]. Patient on stretcher, Discharge complete.

## 2025-01-30 NOTE — DISCHARGE INSTRUCTIONS
Remain NWB on the right arm.  Okay to work on ROM of the wrist/elbow/shoulder  Can change dressing as needed for drainage.  She is on Eliquis so expect some drainage from incision.  F/U with Dr Fernando in 2 weeks.  Call [facility] for appointment time and date for follow up at [phone].    Currently reinforced dressing with ABD pads and ace wrap to right arm.

## 2025-01-30 NOTE — CARE COORDINATION
[facility] able [facility] patient today, if medically ready.    Luisa Lawrence RN

## 2025-01-30 NOTE — DISCHARGE INSTR - COC
Continuity of Care Form    Patient Name: Kirsten Dacosta   :  1955  MRN:  [id number]    Admit date:  2025  Discharge date:  2025    Code Status Order: Full Code   Advance Directives:   Advance Care Flowsheet Documentation        Date/Time Healthcare Directive Type of Healthcare Directive Copy in Chart Healthcare Agent Appointed Healthcare Agent's Name Healthcare Agent's Phone Number    25 1617 Yes, patient has an advance directive for healthcare treatment  Durable power of  for health care  No, copy requested from family  Healthcare power of   Phyllis Botello  [phone]     25 1021 Yes, patient has an advance directive for healthcare treatment  Durable power of  for health care  No, copy requested from family  Healthcare power of   Phyllis Ellis  [phone]                     Admitting Physician:  Joel Warner MD  PCP: Moshe Mcghee MD    Discharging Nurse:   Discharging Hospital Unit/Room#: 3202/3202-01  Discharging Unit Phone Number: [phone]  Emergency Contact:   Extended Emergency Contact Information  Primary Emergency Contact: RhondaPhyllis   [facility]  Home Phone: [phone]  Mobile Phone: [phone]  Relation: Brother/Sister  Secondary Emergency Contact: Kalpana Montoya  Home Phone: [phone]  Relation: Brother/Sister    Past Surgical History:  Past Surgical History:   Procedure Laterality Date    ARM SURGERY Right 2025    RIGHT HUMERUS OPEN REDUCTION INTERNAL FIXATION OF SUPRACONDYLAR AND SHAFT FRACTURE performed by Francisco J Fernando MD at [facility] OR    HYSTERECTOMY (CERVIX STATUS UNKNOWN)      TONSILLECTOMY AND ADENOIDECTOMY         Immunization History:   Immunization History   Administered Date(s) Administered    COVID-19, MODERNA, (age 12y+), IM, 50mcg/0.5mL 2023    COVID-19, PFIZER Bivalent, DO NOT Dilute, (age 12y+), IM, 30 mcg/0.3 mL 10/17/2022    COVID-19, PFIZER PURPLE top, DILUTE for

## 2025-01-30 NOTE — WOUND CARE
Wound care rounds for decreased sammi score.  Pt declined skin assessment but agreed to nelson legs and feet being assessed.  Skin was noted to be dry. Pt agreed to moisturizing cream.  Skin to nelson heels intact. REMDY moisturizing cream applied to nelson legs and feet. Pt tolerated well and was appreciative of care.   Please contact for any wound care concerns.

## 2025-01-31 NOTE — DISCHARGE SUMMARY
Hospital Medicine Discharge Summary      Patient ID: Kirsten Dacosta , [id number]     Patient's PCP: Moshe Mcghee MD    Admit Date: 1/27/2025     Discharge Date: 1/30/2025      Admitting Physician: Joel Warner MD    Discharge Physician: ANNA FIGUEROA MD     Discharge Diagnoses:     Active Hospital Problems    Diagnosis Date Noted    Closed supracondylar fracture of right humerus [S42.411A] 01/29/2025    Closed nondisplaced spiral fracture of shaft of right humerus, initial encounter [S42.344A] 01/27/2025    Fall [W19.XXXA] 01/27/2025    Closed displaced comminuted fracture of shaft of right humerus [S42.351A] 01/27/2025         The patient was seen and examined on the day of discharge and this discharge summary is in conjunction with any daily progress note from day of discharge.          HOSPITAL COURSE    Patient demographics:  The patient  Kirsten Dacosta is a 70 y.o. female     Significant past medical history:   Patient Active Problem List   Diagnosis    Pure hypercholesterolemia    Pre-diabetes    Benign hypertension    Hyperparathyroidism (HCC)    Arthritis of knee    Osteoporosis without current pathological fracture    Hypercalcemia    Postmenopausal osteoporosis    Premature menopause    Vitamin D deficiency    Hypomagnesemia    COPD (chronic obstructive pulmonary disease) (HCC)    Chronic respiratory failure with hypercapnia    Paroxysmal atrial fibrillation (HCC)    Morbidly obese    Body mass index (BMI) 50.0-59.9, adult    Closed nondisplaced spiral fracture of shaft of right humerus, initial encounter    Fall    Closed displaced comminuted fracture of shaft of right humerus    Closed supracondylar fracture of right humerus         Presenting symptoms:      Vitals: /71   Pulse 100   Temp 98.6 °F (37 °C) (Oral)   Resp 18   Ht 1.575 m (5' 2\")   Wt (!) 161.2 kg (355 lb 6.4 oz)   SpO2 100%   BMI 65.00 kg/m²     Gen:          Alert and oriented x 3   Eyes: PERRL. No

## 2025-02-17 ENCOUNTER — TELEPHONE (OUTPATIENT)
Dept: ORTHOPEDIC SURGERY | Age: 70
End: 2025-02-17

## 2025-02-17 NOTE — TELEPHONE ENCOUNTER
Other PATIENT IS COMING IN TOMORROW FOR POST OP BUT FACILITY BELIEVE HER RIGHT KNEE IS SEPTIC WOULD LIKE TO KNOW IF DR MARSHALL CAN SEE THAT IN TOMORROWS VISIT AS WELL. PH ASK FOR HUSSEIN OR CONOR -[phone]

## 2025-02-18 ENCOUNTER — OFFICE VISIT (OUTPATIENT)
Dept: ORTHOPEDIC SURGERY | Age: 70
End: 2025-02-18
Payer: MEDICARE

## 2025-02-18 DIAGNOSIS — M17.11 PRIMARY OSTEOARTHRITIS OF RIGHT KNEE: Primary | ICD-10-CM

## 2025-02-18 DIAGNOSIS — S42.351A CLOSED DISPLACED COMMINUTED FRACTURE OF SHAFT OF RIGHT HUMERUS, INITIAL ENCOUNTER: ICD-10-CM

## 2025-02-18 PROCEDURE — 1123F ACP DISCUSS/DSCN MKR DOCD: CPT | Performed by: ORTHOPAEDIC SURGERY

## 2025-02-18 PROCEDURE — 99214 OFFICE O/P EST MOD 30 MIN: CPT | Performed by: ORTHOPAEDIC SURGERY

## 2025-02-18 PROCEDURE — 1126F AMNT PAIN NOTED NONE PRSNT: CPT | Performed by: ORTHOPAEDIC SURGERY

## 2025-02-18 RX ORDER — NAPROXEN 500 MG/1
500 TABLET ORAL 2 TIMES DAILY WITH MEALS
Qty: 60 TABLET | Refills: 0 | Status: SHIPPED | OUTPATIENT
Start: 2025-02-18 | End: 2025-03-20

## 2025-02-18 NOTE — PROGRESS NOTES
DIAGNOSIS:   1-Right humerus supracondylar fracture, status post ORIF, locking plate.  2-Right knee pain/signficant DJD  3-Morbid obesity, BMI-65    DATE OF SURGERY:  2025 .    HISTORY OF PRESENT ILLNESS:  Ms. Dacosta  70 y.o.  female right handed, who came in today for 2 weeks postoperative visit.  The patient denies any significant pain in the right arm or elbow. She has been working on gentle ROM. She reports significant pain in the right knee, worse with walking and better with rest. She normally can only ambulate 10 steps with a walker prior to her surgery.  No numbness or tingling sensation. No fever or Chills. She is in a sling.    Past Medical History:   Diagnosis Date    Hyperlipidemia     Hypertension     Type II or unspecified type diabetes mellitus without mention of complication, not stated as uncontrolled        Past Surgical History:   Procedure Laterality Date    ARM SURGERY Right 2025    RIGHT HUMERUS OPEN REDUCTION INTERNAL FIXATION OF SUPRACONDYLAR AND SHAFT FRACTURE performed by Francisco J Fernando MD at [facility] OR    HYSTERECTOMY (CERVIX STATUS UNKNOWN)      TONSILLECTOMY AND ADENOIDECTOMY  1960       Social History     Socioeconomic History    Marital status:      Spouse name: Not on file    Number of children: Not on file    Years of education: Not on file    Highest education level: Not on file   Occupational History    Not on file   Tobacco Use    Smoking status: Former     Current packs/day: 0.00     Average packs/day: 1 pack/day for 10.0 years (10.0 ttl pk-yrs)     Types: Cigarettes     Start date: 1977     Quit date: 1987     Years since quittin.2    Smokeless tobacco: Never   Vaping Use    Vaping status: Never Used   Substance and Sexual Activity    Alcohol use: No    Drug use: No    Sexual activity: Not on file   Other Topics Concern    Not on file   Social History Narrative    Not on file     Social Determinants of Health     Financial Resource

## 2025-02-19 PROBLEM — M17.11 PRIMARY OSTEOARTHRITIS OF RIGHT KNEE: Status: ACTIVE | Noted: 2025-02-19
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
## 2025-03-03 ENCOUNTER — ENROLLMENT (OUTPATIENT)
Dept: PHARMACY | Facility: CLINIC | Age: 70
End: 2025-03-03

## 2025-03-31 ENCOUNTER — TELEPHONE (OUTPATIENT)
Dept: PHARMACY | Facility: CLINIC | Age: 70
End: 2025-03-31

## 2025-03-31 NOTE — TELEPHONE ENCOUNTER
Moshe Mcghee MD, please see below - would patient benefit from updated DEXA due to recent fracture?  Fracture of shaft of right humerus in January, following fall from wheelchair  Last DEXA 2016 - osteoporosis  Appears ortho referred patient back to PCP to review if DEXA is indicated    If appropriate, please order and have your staff notify patient.    Thank you,  Janeen Tavares, PharmD, [facility]  Population Health Pharmacist  [facility] Clinical Pharmacy  Department, toll free: [phone], option 1    ==================================================================  POPULATION [facility] CLINICAL PHARMACY REVIEW: RECENT FRACTURE    Kirsten Dacosta is a 70 y.o. old White (non-) female patient who recently had a fracture of shaft of right humerus (1/27/25 Hospital Encounter, following fall from wheelchair)    Lab Results   Component Value Date    VITD25 25.1 (L) 05/29/2024   *cholecalciferol supplement per med list   *5/29/24 PTH slightly above upper limit of normal    Lab Results   Component Value Date    CALCIUM 8.1 (L) 01/30/2025    PHOS 2.4 (L) 12/04/2017     estimated creatinine clearance is 110 mL/min (based on SCr of 0.7 mg/dL).    DEXA 10/24/2016:  Osteoporosis    Assessment:   - 70 y.o. female with recent fracture and may benefit from DEXA to assess current BMD  Last DEXA 2016 - osteoporosis  2/18/25 ortho OV note: \"I have referred the patient back to the primary care physician for evaluation for osteoporosis, including consideration for DEXA scanning, if this is felt to be clinically indicated. The patient is advised to contact the primary care physician to follow-up for further evaluation.\"    Considerations:  - Suggest obtaining DEXA

## 2025-04-01 ENCOUNTER — OFFICE VISIT (OUTPATIENT)
Dept: ORTHOPEDIC SURGERY | Age: 70
End: 2025-04-01
Payer: MEDICARE

## 2025-04-01 VITALS — HEIGHT: 62 IN | BODY MASS INDEX: 53.92 KG/M2 | WEIGHT: 293 LBS

## 2025-04-01 DIAGNOSIS — S42.351A CLOSED DISPLACED COMMINUTED FRACTURE OF SHAFT OF RIGHT HUMERUS, INITIAL ENCOUNTER: Primary | ICD-10-CM

## 2025-04-01 DIAGNOSIS — M25.561 RIGHT KNEE PAIN, UNSPECIFIED CHRONICITY: ICD-10-CM

## 2025-04-01 PROCEDURE — 1123F ACP DISCUSS/DSCN MKR DOCD: CPT | Performed by: ORTHOPAEDIC SURGERY

## 2025-04-01 PROCEDURE — 99213 OFFICE O/P EST LOW 20 MIN: CPT | Performed by: ORTHOPAEDIC SURGERY

## 2025-04-01 PROCEDURE — 1126F AMNT PAIN NOTED NONE PRSNT: CPT | Performed by: ORTHOPAEDIC SURGERY

## 2025-04-01 PROCEDURE — 1159F MED LIST DOCD IN RCRD: CPT | Performed by: ORTHOPAEDIC SURGERY

## 2025-04-02 ENCOUNTER — TELEPHONE (OUTPATIENT)
Dept: ORTHOPEDIC SURGERY | Age: 70
End: 2025-04-02

## 2025-04-02 NOTE — TELEPHONE ENCOUNTER
Calling for directions for new PT order    Please advise    [phone]- Rafal Physical therapist at the [facility]

## 2025-04-03 NOTE — TELEPHONE ENCOUNTER
JACEY Lyles. He requested updated ROM and WB status for PT.   Advised him that I will call him back with updated orders by 4:30 today    OVN pending.

## 2025-04-07 NOTE — TELEPHONE ENCOUNTER
Moshe Mcghee MD, please see below - would patient benefit from updated DEXA due to recent fracture?  Fracture of shaft of right humerus in January, following fall from wheelchair  Last DEXA 2016 - osteoporosis  Appears ortho referred patient back to PCP to review if DEXA is indicated     If appropriate, please order and have your staff notify patient.     Thank you,  Janeen Tavares, PharmD, [facility]  Population Health Pharmacist  [facility] Clinical Pharmacy  Department, toll free: [phone], option 1     =========================================================    Appears encounter remains open in provider inbasket. Per 4/2/25 telephone encounter, [facility]  Will also send [employer] message.      For Pharmacy Admin Tracking Only    Program: [employer]  CPA in place:  No  Recommendation Provided To: Provider: 1 via Note to Provider  Intervention Detail: Lab(s) Ordered  Intervention Accepted By: Provider: 0  Gap Closed?: No   Time Spent (min): 15

## 2025-04-08 PROBLEM — M25.561 RIGHT KNEE PAIN: Status: ACTIVE | Noted: 2025-04-08

## 2025-04-08 NOTE — PROGRESS NOTES
2/18/2025            IMAGING:  Xray taken today in the office, 2 views right humreus showed anatomic alignment of the humerus supracondylar fracture, locking plate in good position, there is a screw backing out of the distal plate.    Xray 3 views of the right knee taken 2/18/2025 in the office were reviewed and showed significant bony erosion of the femur and tibial with significant arthritic changes and bone loss.       IMPRESSION:    1-2 weeks out from right humerus supracondylar ORIF, and doing very well.  2-Right knee pain/signficant DJD  3-Morbid obesity, BMI-65    PLAN:  I have told the patient to work on ROM, WBAT right arm. PT and OT for ROM.  The patient will come back for a follow up in 6 weeks.  At that time, we will take 3 views of the right humerus. For the knee: We discussed the Xray results and the significant bony changes and arthritis. We discussed surgical and non surgical options.  We discussed weight management.     As this patient has demonstrated risk factors for osteoporosis, such as age greater than fifty years and evidence of a fracture, I have referred the patient back to the primary care physician for evaluation for osteoporosis, including consideration for DEXA scanning, if this is felt to be clinically indicated.  The patient is advised to contact the primary care physician to follow-up for further evaluation.       Francisco J Fernando MD

## 2025-04-09 ENCOUNTER — TELEPHONE (OUTPATIENT)
Dept: ORTHOPEDIC SURGERY | Age: 70
End: 2025-04-09

## 2025-04-09 NOTE — TELEPHONE ENCOUNTER
[facility] [phone]   [phone]  Called regarding the patient WT Bearing Status

## 2025-05-13 ENCOUNTER — OFFICE VISIT (OUTPATIENT)
Dept: ORTHOPEDIC SURGERY | Age: 70
End: 2025-05-13
Payer: MEDICARE

## 2025-05-13 VITALS — BODY MASS INDEX: 53.92 KG/M2 | HEIGHT: 62 IN | WEIGHT: 293 LBS

## 2025-05-13 DIAGNOSIS — M17.11 PRIMARY OSTEOARTHRITIS OF RIGHT KNEE: ICD-10-CM

## 2025-05-13 DIAGNOSIS — M17.12 PRIMARY OSTEOARTHRITIS OF LEFT KNEE: ICD-10-CM

## 2025-05-13 DIAGNOSIS — S42.351A CLOSED DISPLACED COMMINUTED FRACTURE OF SHAFT OF RIGHT HUMERUS, INITIAL ENCOUNTER: Primary | ICD-10-CM

## 2025-05-13 PROCEDURE — 1159F MED LIST DOCD IN RCRD: CPT | Performed by: ORTHOPAEDIC SURGERY

## 2025-05-13 PROCEDURE — 99214 OFFICE O/P EST MOD 30 MIN: CPT | Performed by: ORTHOPAEDIC SURGERY

## 2025-05-13 PROCEDURE — 1126F AMNT PAIN NOTED NONE PRSNT: CPT | Performed by: ORTHOPAEDIC SURGERY

## 2025-05-13 PROCEDURE — 1123F ACP DISCUSS/DSCN MKR DOCD: CPT | Performed by: ORTHOPAEDIC SURGERY

## 2025-05-13 RX ORDER — PREDNISONE 10 MG/1
TABLET ORAL
Qty: 26 TABLET | Refills: 0 | Status: SHIPPED | OUTPATIENT
Start: 2025-05-13

## 2025-05-13 NOTE — PROGRESS NOTES
DIAGNOSIS:   1-Right humerus supracondylar fracture, status post ORIF, locking plate.  2-Bilateral knee pain/signficant DJD  3-Morbid obesity, BMI-65    DATE OF SURGERY:  2025 .    HISTORY OF PRESENT ILLNESS:  Ms. Dacosta  70 y.o.  female right handed, who came in today for 4 months postoperative visit.  The patient denies any significant pain in the right arm or elbow. She has been working on ROM. She reports significant pain in thebilateral knee, worse with walking and better with rest. She normally can only ambulate 10 steps with a walker prior to her surgery.  No numbness or tingling sensation. No fever or Chills.     Past Medical History:   Diagnosis Date    Hyperlipidemia     Hypertension     Type II or unspecified type diabetes mellitus without mention of complication, not stated as uncontrolled        Past Surgical History:   Procedure Laterality Date    ARM SURGERY Right 2025    RIGHT HUMERUS OPEN REDUCTION INTERNAL FIXATION OF SUPRACONDYLAR AND SHAFT FRACTURE performed by Francisco J Fernando MD at [facility] OR    HYSTERECTOMY (CERVIX STATUS UNKNOWN)      TONSILLECTOMY AND ADENOIDECTOMY  1960       Social History     Socioeconomic History    Marital status:      Spouse name: Not on file    Number of children: Not on file    Years of education: Not on file    Highest education level: Not on file   Occupational History    Not on file   Tobacco Use    Smoking status: Former     Current packs/day: 0.00     Average packs/day: 1 pack/day for 10.0 years (10.0 ttl pk-yrs)     Types: Cigarettes     Start date: 1977     Quit date: 1987     Years since quittin.4    Smokeless tobacco: Never   Vaping Use    Vaping status: Never Used   Substance and Sexual Activity    Alcohol use: No    Drug use: No    Sexual activity: Not on file   Other Topics Concern    Not on file   Social History Narrative    Not on file     Social Drivers of Health     Financial Resource Strain: Low Risk

## (undated) DEVICE — DRAPE,SPLIT ,77X120: Brand: MEDLINE

## (undated) DEVICE — DRAPE,U/ SHT,SPLIT,PLAS,STERIL: Brand: MEDLINE

## (undated) DEVICE — INTENDED FOR TISSUE SEPARATION, AND OTHER PROCEDURES THAT REQUIRE A SHARP SURGICAL BLADE TO PUNCTURE OR CUT.: Brand: BARD-PARKER ® STAINLESS STEEL BLADES

## (undated) DEVICE — SHEET,DRAPE,53X77,STERILE: Brand: MEDLINE

## (undated) DEVICE — SUTURE VICRYL + SZ 3-0 L18IN ABSRB UD SH 1/2 CIR TAPERCUT NDL VCP864D

## (undated) DEVICE — DRESSING,GAUZE,XEROFORM,CURAD,5"X9",ST: Brand: CURAD

## (undated) DEVICE — COUNTERSINK FOR SCREWS 2.7,3.5MM: Brand: VARIAX

## (undated) DEVICE — SHOULDER STABILIZATION KIT,                                    DISPOSABLE 12 PER BOX

## (undated) DEVICE — SCREWDRIVER BLADE T10 AO, SELF RETAINING: Brand: VARIAX

## (undated) DEVICE — 3D ISLAND DRESSING 4IN X 8IN: Brand: THERABOND 3D ANTIMICROBIAL BARRIER SYSTEMS

## (undated) DEVICE — SUTURE VICRYL + SZ 2-0 L18IN ABSRB UD CT1 L36MM 1/2 CIR VCP839D

## (undated) DEVICE — BANDAGE COBAN 4 IN COMPR W4INXL5YD FOAM COHESIVE QUIK STK SELF ADH SFT

## (undated) DEVICE — EXTRA ARTICULAR DISTAL HUMERUS PLATE, R: Brand: PANGEA

## (undated) DEVICE — DRILL BIT, AO: Brand: PANGEA

## (undated) DEVICE — T-MAX DISPOSABLE FACE MASK 8 PER BOX

## (undated) DEVICE — LOCKING SCREW: Brand: PANGEA

## (undated) DEVICE — SOUTHSIDE TURNOVER: Brand: MEDLINE INDUSTRIES, INC.

## (undated) DEVICE — STAPLER SKIN H3.9MM WIRE DIA0.58MM CRWN 6.9MM 35 STPL ROT

## (undated) DEVICE — APPLICATOR MEDICATED 26 CC SOLUTION HI LT ORNG CHLORAPREP

## (undated) DEVICE — SUTURE VICRYL + SZ 0 L18IN ABSRB UD L36MM CT-1 1/2 CIR VCP840D

## (undated) DEVICE — Device

## (undated) DEVICE — ELECTRODE PT RET AD L9FT HI MOIST COND ADH HYDRGEL CORDED

## (undated) DEVICE — SPONGE LAP W18XL18IN WHT COT 4 PLY FLD STRUNG RADPQ DISP ST 2 PER PACK

## (undated) DEVICE — GLOVE ORTHO 8   MSG9480

## (undated) DEVICE — SUTURE MONOCRYL SZ 3-0 L27IN ABSRB UD L24MM PS-1 3/8 CIR PRIM Y936H

## (undated) DEVICE — TOWEL,OR,DSP,ST,BLUE,STD,6/PK,12PK/CS: Brand: MEDLINE

## (undated) DEVICE — 3D ISLAND DRESSING 4IN X 12IN: Brand: THERABOND 3D ANTIMICROBIAL BARRIER SYSTEMS

## (undated) DEVICE — COVER LT HNDL BLU PLAS